# Patient Record
Sex: FEMALE | Race: WHITE | NOT HISPANIC OR LATINO | Employment: FULL TIME | URBAN - METROPOLITAN AREA
[De-identification: names, ages, dates, MRNs, and addresses within clinical notes are randomized per-mention and may not be internally consistent; named-entity substitution may affect disease eponyms.]

---

## 2017-01-19 ENCOUNTER — OFFICE VISIT (OUTPATIENT)
Dept: MEDICAL GROUP | Age: 32
End: 2017-01-19
Payer: COMMERCIAL

## 2017-01-19 VITALS
WEIGHT: 164 LBS | HEART RATE: 74 BPM | OXYGEN SATURATION: 98 % | RESPIRATION RATE: 14 BRPM | DIASTOLIC BLOOD PRESSURE: 80 MMHG | BODY MASS INDEX: 28 KG/M2 | HEIGHT: 64 IN | TEMPERATURE: 98.4 F | SYSTOLIC BLOOD PRESSURE: 110 MMHG

## 2017-01-19 DIAGNOSIS — I34.1 MVP (MITRAL VALVE PROLAPSE): ICD-10-CM

## 2017-01-19 DIAGNOSIS — F17.200 TOBACCO DEPENDENCE: ICD-10-CM

## 2017-01-19 DIAGNOSIS — N94.3 PMS (PREMENSTRUAL SYNDROME): ICD-10-CM

## 2017-01-19 DIAGNOSIS — Z76.89 ENCOUNTER TO ESTABLISH CARE: ICD-10-CM

## 2017-01-19 DIAGNOSIS — Z30.011 ORAL CONTRACEPTION INITIATION: ICD-10-CM

## 2017-01-19 PROCEDURE — 99204 OFFICE O/P NEW MOD 45 MIN: CPT | Performed by: PHYSICIAN ASSISTANT

## 2017-01-19 RX ORDER — DROSPIRENONE AND ETHINYL ESTRADIOL 0.02-3(28)
1 KIT ORAL DAILY
COMMUNITY
End: 2017-01-19

## 2017-01-19 RX ORDER — NORETHINDRONE ACETATE AND ETHINYL ESTRADIOL 1.5-30(21)
1 KIT ORAL DAILY
Qty: 28 TAB | Refills: 5 | Status: SHIPPED | OUTPATIENT
Start: 2017-01-19 | End: 2019-03-13

## 2017-01-19 ASSESSMENT — PATIENT HEALTH QUESTIONNAIRE - PHQ9: CLINICAL INTERPRETATION OF PHQ2 SCORE: 0

## 2017-01-19 NOTE — PROGRESS NOTES
"Subjective:   Gabi Ruth is a 31 y.o. female here today to establish care and to change her OCP.    PMS (premenstrual syndrome)  This is a 31-year-old female who has been taking Pascual for 10 years. She states that over the past year or so she has severe PMS. She has significant emotional swings. Also she has irregular bleeding where her bleeding may last for 9 days. She is requesting a change in her OCP.    Oral contraception initiation  Visit is more to change her current Pascual prescription to another medication may control her PMS better. It's always been conscious about the possibility of having a stroke or blood clot while on birth control. She is currently in a  monogamous relationship. She has no children yet.    Tobacco dependence  History of smoking. Used to smoke one pack a day. She did quit for 2 years. Since returning to [place] 2 years ago she smokes about one cigarette a week.    MVP (mitral valve prolapse)  States that she was diagnosed with mitral valve prolapse with regurgitation as an infant. Was on medications. Did follow with a cardiologist. Has no current symptoms. No shortness of breath or chest pain.       Current medicines (including changes today)  Current Outpatient Prescriptions   Medication Sig Dispense Refill   • drospirenone-ethinyl estradiol (PASCUAL) 3-0.02 MG per tablet Take 1 Tab by mouth every day.     • norethindrone-ethinyl estradiol-iron (MICROGESTIN FE1.5/30) 1.5-30 MG-MCG tablet Take 1 Tab by mouth every day. 28 Tab 5     No current facility-administered medications for this visit.     She  has no past medical history on file.    ROS   No chest pain, no shortness of breath, no abdominal pain and all other systems were reviewed and are negative.       Objective:     Blood pressure 110/80, pulse 74, temperature 36.9 °C (98.4 °F), resp. rate 14, height 1.626 m (5' 4.02\"), weight 74.39 kg (164 lb), last menstrual period 01/03/2017, SpO2 98 %, not currently breastfeeding. Body mass " index is 28.14 kg/(m^2).   Physical Exam:  Constitutional: Alert, no distress.  Skin: Warm, dry, good turgor, no rashes in visible areas.  Eye: Equal, round and reactive, conjunctiva clear, lids normal.  ENMT: Lips without lesions, good dentition, oropharynx clear.  Neck: Trachea midline, no masses.   Lymph: No cervical or supraclavicular lymphadenopathy  Respiratory: Unlabored respiratory effort, lungs clear to auscultation, no wheezes, no ronchi.  Cardiovascular: Normal S1, S2, no murmur, no edema.  Abdomen: Soft, non-tender, no masses.  Psych: Alert and oriented x3, normal affect and mood.        Assessment and Plan:   The following treatment plan was discussed    1. PMS (premenstrual syndrome)  Chronic condition. Changed OCP from Umm to Microgestin. Advised to contact me if symptoms not improving. Advised to make an appointment with gynecology.  - norethindrone-ethinyl estradiol-iron (MICROGESTIN FE1.5/30) 1.5-30 MG-MCG tablet; Take 1 Tab by mouth every day.  Dispense: 28 Tab; Refill: 5    2. Oral contraception initiation  Prescribed Microgestin as directed.  - norethindrone-ethinyl estradiol-iron (MICROGESTIN FE1.5/30) 1.5-30 MG-MCG tablet; Take 1 Tab by mouth every day.  Dispense: 28 Tab; Refill: 5    3. Tobacco dependence  Currently one cigarette per week. Did offer referral to the tobacco cessation program but patient declined.     4. MVP (mitral valve prolapse)  Asymptomatic. No murmur noted. Will monitor. Advised to contact me if she becomes symptomatic.    5. Encounter to establish care      Followup: Return if symptoms worsen or fail to improve.    Please note that this dictation was created using voice recognition software. I have made every reasonable attempt to correct obvious errors, but I expect that there are errors of grammar and possibly content that I did not discover before finalizing the note.

## 2017-01-19 NOTE — MR AVS SNAPSHOT
"        Gabi Ruth   2017 2:20 PM   Office Visit   MRN: [id number]    Department:  [facility]   Dept Phone:  [phone]    Description:  Female : 1985   Provider:  Erick Marie PA-C           Reason for Visit     Establish Care Change birth control pill      Allergies as of 2017     Allergen Noted Reactions    Amoxicillin 2016   Hives      You were diagnosed with     PMS (premenstrual syndrome)   [036496]       Oral contraception initiation   [6284987]       Tobacco dependence   [343460]       MVP (mitral valve prolapse)   [636511]         Vital Signs     Blood Pressure Pulse Temperature Respirations Height Weight    110/80 mmHg 74 36.9 °C (98.4 °F) 14 1.626 m (5' 4.02\") 74.39 kg (164 lb)    Body Mass Index Oxygen Saturation Last Menstrual Period Breastfeeding? Smoking Status       28.14 kg/m2 98% 2017 No Light Tobacco Smoker       Basic Information     Date Of Birth Sex Race Ethnicity Preferred Language    1985 Female White Non- English      Problem List              ICD-10-CM Priority Class Noted - Resolved    Oral contraception initiation Z30.011   2017 - Present    PMS (premenstrual syndrome) N94.3   2017 - Present    Tobacco dependence F17.200   2017 - Present    MVP (mitral valve prolapse) I34.1   2017 - Present      Health Maintenance        Date Due Completion Dates    IMM DTaP/Tdap/Td Vaccine (1 - Tdap) 10/2/2004 ---    PAP SMEAR 10/2/2006 ---    IMM INFLUENZA (1) 2018 (Originally 2016) ---            Current Immunizations     No immunizations on file.      Below and/or attached are the medications your provider expects you to take. Review all of your home medications and newly ordered medications with your provider and/or pharmacist. Follow medication instructions as directed by your provider and/or pharmacist. Please keep your medication list with you and share with your provider. Update the information when " medications are discontinued, doses are changed, or new medications (including over-the-counter products) are added; and carry medication information at all times in the event of emergency situations     Allergies:  AMOXICILLIN - Hives               Medications  Valid as of: January 19, 2017 -  2:52 PM    Generic Name Brand Name Tablet Size Instructions for use    Drospirenone-Ethinyl Estradiol (Tab) PASCUAL 3-0.02 MG Take 1 Tab by mouth every day.        Norethin Ace-Eth Estrad-FE (Tab) MICROGESTIN FE1.5/30 1.5-30 MG-MCG Take 1 Tab by mouth every day.        .                 Medicines prescribed today were sent to:     [id number] - [place], [place] - [address]    [address] [place] [address]    Phone: [phone] Fax: [phone]    Open 24 Hours?: No      Medication refill instructions:       If your prescription bottle indicates you have medication refills left, it is not necessary to call your provider’s office. Please contact your pharmacy and they will refill your medication.    If your prescription bottle indicates you do not have any refills left, you may request refills at any time through one of the following ways: The online [facility] system (except Urgent Care), by calling your provider’s office, or by asking your pharmacy to contact your provider’s office with a refill request. Medication refills are processed only during regular business hours and may not be available until the next business day. Your provider may request additional information or to have a follow-up visit with you prior to refilling your medication.   *Please Note: Medication refills are assigned a new Rx number when refilled electronically. Your pharmacy may indicate that no refills were authorized even though a new prescription for the same medication is available at the pharmacy. Please request the medicine by name with the pharmacy before contacting your provider for a refill.           [employer] Access Code: Activation code  not generated  Current MyChart Status: Active

## 2017-01-19 NOTE — ASSESSMENT & PLAN NOTE
Visit is more to change her current Umm prescription to another medication may control her PMS better. It's always been conscious about the possibility of having a stroke or blood clot while on birth control. She is currently in a  monogamous relationship. She has no children yet.

## 2017-01-19 NOTE — ASSESSMENT & PLAN NOTE
This is a 31-year-old female who has been taking Umm for 10 years. She states that over the past year or so she has severe PMS. She has significant emotional swings. Also she has irregular bleeding where her bleeding may last for 9 days. She is requesting a change in her OCP.

## 2017-01-19 NOTE — Clinical Note
[facility]  Erick Marie PA-C  [address]  [place] [address]  Fax: [phone] Authorization for Release/Disclosure of Protected Health Information   Name: GABI ROGERS : 1985 SSN: [id number]   Address: [address] [place] [address]  [place] [address] Phone:    [phone] (home)    I authorize the entity listed below to release/disclose the PHI below to [facility]/Erick Marie PA-C   Provider or Entity Name:    Dr Erick [facility]   Address   City, [facility], [facility], [place]   Phone:      Fax:     Reason for request: continuity of care   Information to be released:    [  ] LAST COLONOSCOPY, including any PATH REPORT [  ] LAST DEXA  [  ] LAST MAMMOGRAM  [xxx] LAST PAP [  ] RETINA EXAM REPORT  [  ] IMMUNIZATION RECORDS  [xxxx ] Release all info      [  ] Check here and initial the line next to each item to release ALL health information INCLUDING  _____ Care and treatment for drug and / or alcohol abuse  _____ HIV testing, infection status, or AIDS  _____ Genetic Testing    DATES OF SERVICE OR TIME PERIOD TO BE DISCLOSED: _____________  I understand and acknowledge that:  * This Authorization may be revoked at any time by you in writing, except if your health information has already been used or disclosed.  * Your health information that will be used or disclosed as a result of you signing this authorization could be re-disclosed by the recipient. If this occurs, your re-disclosed health information may no longer be protected by State or Federal laws.  * You may refuse to sign this Authorization. Your refusal will not affect your ability to obtain treatment.  * This Authorization becomes effective upon signing and will  on (date) __________. If no date is indicated, this Authorization will  one (1) year from the signature date.    Name: Gabi Rogers    Signature:     Date: 2017

## 2017-01-19 NOTE — ASSESSMENT & PLAN NOTE
States that she was diagnosed with mitral valve prolapse with regurgitation as an infant. Was on medications. Did follow with a cardiologist. Has no current symptoms. No shortness of breath or chest pain.

## 2017-01-19 NOTE — ASSESSMENT & PLAN NOTE
History of smoking. Used to smoke one pack a day. She did quit for 2 years. Since returning to [place] 2 years ago she smokes about one cigarette a week.

## 2019-03-13 ENCOUNTER — OFFICE VISIT (OUTPATIENT)
Dept: URGENT CARE | Facility: CLINIC | Age: 34
End: 2019-03-13
Payer: COMMERCIAL

## 2019-03-13 VITALS
DIASTOLIC BLOOD PRESSURE: 80 MMHG | OXYGEN SATURATION: 95 % | TEMPERATURE: 98.1 F | RESPIRATION RATE: 18 BRPM | HEART RATE: 116 BPM | WEIGHT: 195 LBS | HEIGHT: 64 IN | BODY MASS INDEX: 33.29 KG/M2 | SYSTOLIC BLOOD PRESSURE: 130 MMHG

## 2019-03-13 DIAGNOSIS — I34.1 MVP (MITRAL VALVE PROLAPSE): ICD-10-CM

## 2019-03-13 DIAGNOSIS — J02.9 ACUTE PHARYNGITIS, UNSPECIFIED ETIOLOGY: ICD-10-CM

## 2019-03-13 LAB
INT CON NEG: NORMAL
INT CON POS: NORMAL
S PYO AG THROAT QL: NEGATIVE

## 2019-03-13 PROCEDURE — 99214 OFFICE O/P EST MOD 30 MIN: CPT | Performed by: NURSE PRACTITIONER

## 2019-03-13 PROCEDURE — 87880 STREP A ASSAY W/OPTIC: CPT | Performed by: NURSE PRACTITIONER

## 2019-03-13 RX ORDER — ETHINYL ESTRADIOL AND LEVONORGESTREL 150-30(84)
KIT ORAL
COMMUNITY
Start: 2019-02-19 | End: 2021-06-09

## 2019-03-13 RX ORDER — AZITHROMYCIN 250 MG/1
TABLET, FILM COATED ORAL
Qty: 6 TAB | Refills: 0 | Status: SHIPPED | OUTPATIENT
Start: 2019-03-13 | End: 2021-06-09

## 2019-03-13 NOTE — PROGRESS NOTES
Chief Complaint   Patient presents with   • Pharyngitis     x2days, sore throat, dry cough        HISTORY OF PRESENT ILLNESS: Patient is a 33 y.o. female who presents today due to complaints of a sore throat for the past two days. Reports associated fever, chills, pain with swallowing. Also notes mild dry cough and rhinitis, she is mostly concerned about her throat.  She has tried OTC medications at home without much improvement. Denies known ill contacts. Notes history of MVP.     Patient Active Problem List    Diagnosis Date Noted   • Oral contraception initiation 01/19/2017   • PMS (premenstrual syndrome) 01/19/2017   • Tobacco dependence 01/19/2017   • MVP (mitral valve prolapse) 01/19/2017       Allergies:Amoxicillin    Current Outpatient Prescriptions Ordered in [facility]   Medication Sig Dispense Refill   • Levonorgest-Eth Estrad 91-Day (CAMRESE PO) Take  by mouth.     • azithromycin (ZITHROMAX) 250 MG Tab Take two tabs on day one followed by one tab on days 2-5. 6 Tab 0   • AMETHIA 0.15-0.03 &0.01 MG Tab        No current Epic-ordered facility-administered medications on file.        History reviewed. No pertinent past medical history.    Social History   Substance Use Topics   • Smoking status: Former Smoker     Packs/day: 0.01     Types: Cigarettes   • Smokeless tobacco: Never Used   • Alcohol use 4.2 oz/week     7 Glasses of wine per week      Comment: social smoker       Family Status   Relation Status   • Mo Alive   • Fa Alive   • PGFa (Not Specified)     Family History   Problem Relation Age of Onset   • Stroke Paternal Grandfather        ROS:  Review of Systems   Constitutional: Positive for fever, chills. Negative for weight loss and malaise/fatigue.   HENT: Positive for sore throat. Negative for ear pain, nosebleeds, congestion.   Eyes: Negative for vision changes.   Cardiovascular: Negative for chest pain, palpitations, orthopnea and leg swelling.   Respiratory: Negative for cough, sputum production,  "shortness of breath and wheezing.   Gastrointestinal: Negative for abdominal pain, nausea, vomiting or diarrhea.   Skin: Negative for rash, diaphoresis.     Exam:  Blood pressure 130/80, pulse (!) 116, temperature 36.7 °C (98.1 °F), temperature source Temporal, resp. rate 18, height 1.626 m (5' 4\"), weight 88.5 kg (195 lb), SpO2 95 %, not currently breastfeeding.  General: well-nourished, well-developed female in NAD  Head: normocephalic, atraumatic  Eyes: PERRLA, no conjunctival injection, acuity grossly intact, lids normal.  Ears: normal shape and symmetry, no tenderness, no discharge. External canals are without any significant edema or erythema. Tympanic membranes are without any inflammation, no effusion. Gross auditory acuity is intact.  Nose: symmetrical without tenderness, no discharge.  Mouth/Throat: reasonable hygiene. There is minimal erythema, without exudates and tonsillar enlargement present.  Neck: no masses, range of motion within normal limits, no tracheal deviation. No obvious thyroid enlargement.   Lymph: bilateral anterior cervical adenopathy. No supraclavicular adenopathy.   Neuro: alert and oriented. Cranial nerves 1-12 grossly intact. No sensory deficit.   Cardiovascular: regular rate (ausculated at 98), regular rhythm. No edema.  Pulmonary: no distress. Chest is symmetrical with respiration, no wheezes, crackles, or rhonchi.   Musculoskeletal: no clubbing, appropriate muscle tone, gait is stable.  Skin: warm, dry, intact, no clubbing, no cyanosis, no rashes.   Psych: appropriate mood, affect, judgement.         Assessment/Plan:  1. Acute pharyngitis, unspecified etiology  POCT Rapid Strep A    azithromycin (ZITHROMAX) 250 MG Tab   2. MVP (mitral valve prolapse)  POCT Rapid Strep A    azithromycin (ZITHROMAX) 250 MG Tab         POC strep negative      Discussed symptoms most likely viral, self limiting illness. Did not see any evidence of a bacterial process. Discussed natural progression and " sx care. Contingent antibiotic prescription given to patient to fill upon meeting criteria of guidelines discussed. OTC motrin or tylenol for pain/fever control. Hand hygiene. Increase fluid intake, rest. Warm salt water gargles.   Supportive care, differential diagnoses, and indications for immediate follow-up discussed with patient.   Pathogenesis of diagnosis discussed including typical length and natural progression.   Instructed to return to clinic or nearest emergency department for any change in condition, further concerns, or worsening of symptoms.  Patient states understanding of the plan of care and discharge instructions.  Instructed to make an appointment, for follow up, with her primary care provider.        Please note that this dictation was created using voice recognition software. I have made every reasonable attempt to correct obvious errors, but I expect that there are errors of grammar and possibly content that I did not discover before finalizing the note.      STEPHANI Bain.

## 2021-06-09 ENCOUNTER — HOSPITAL ENCOUNTER (OUTPATIENT)
Facility: MEDICAL CENTER | Age: 36
End: 2021-06-09
Attending: SPECIALIST
Payer: COMMERCIAL

## 2021-06-09 ENCOUNTER — OFFICE VISIT (OUTPATIENT)
Dept: URGENT CARE | Facility: CLINIC | Age: 36
End: 2021-06-09
Payer: COMMERCIAL

## 2021-06-09 ENCOUNTER — HOSPITAL ENCOUNTER (OUTPATIENT)
Facility: MEDICAL CENTER | Age: 36
End: 2021-06-09
Attending: PHYSICIAN ASSISTANT
Payer: COMMERCIAL

## 2021-06-09 ENCOUNTER — HOSPITAL ENCOUNTER (OUTPATIENT)
Dept: RADIOLOGY | Facility: MEDICAL CENTER | Age: 36
End: 2021-06-09
Attending: SPECIALIST
Payer: COMMERCIAL

## 2021-06-09 VITALS
WEIGHT: 229 LBS | SYSTOLIC BLOOD PRESSURE: 106 MMHG | TEMPERATURE: 97.2 F | HEART RATE: 100 BPM | OXYGEN SATURATION: 97 % | DIASTOLIC BLOOD PRESSURE: 60 MMHG | RESPIRATION RATE: 16 BRPM | BODY MASS INDEX: 39.09 KG/M2 | HEIGHT: 64 IN

## 2021-06-09 DIAGNOSIS — R10.2 PELVIC PAIN IN FEMALE: ICD-10-CM

## 2021-06-09 DIAGNOSIS — B96.89 BV (BACTERIAL VAGINOSIS): ICD-10-CM

## 2021-06-09 DIAGNOSIS — R10.32 LEFT LOWER QUADRANT ABDOMINAL PAIN: ICD-10-CM

## 2021-06-09 DIAGNOSIS — N76.0 BV (BACTERIAL VAGINOSIS): ICD-10-CM

## 2021-06-09 DIAGNOSIS — N76.0 ACUTE VAGINITIS: ICD-10-CM

## 2021-06-09 LAB
APPEARANCE UR: CLEAR
BILIRUB UR STRIP-MCNC: NEGATIVE MG/DL
COLOR UR AUTO: YELLOW
GLUCOSE UR STRIP.AUTO-MCNC: NEGATIVE MG/DL
INT CON NEG: NORMAL
INT CON POS: NORMAL
KETONES UR STRIP.AUTO-MCNC: NEGATIVE MG/DL
LEUKOCYTE ESTERASE UR QL STRIP.AUTO: NORMAL
NITRITE UR QL STRIP.AUTO: 0.2
PH UR STRIP.AUTO: NEGATIVE [PH] (ref 5–8)
POC URINE PREGNANCY TEST: NEGATIVE
PROT UR QL STRIP: 6 MG/DL
RBC UR QL AUTO: 1.02
SP GR UR STRIP.AUTO: NEGATIVE
UROBILINOGEN UR STRIP-MCNC: NEGATIVE MG/DL

## 2021-06-09 PROCEDURE — 76830 TRANSVAGINAL US NON-OB: CPT

## 2021-06-09 PROCEDURE — 81025 URINE PREGNANCY TEST: CPT | Performed by: PHYSICIAN ASSISTANT

## 2021-06-09 PROCEDURE — 81002 URINALYSIS NONAUTO W/O SCOPE: CPT | Performed by: PHYSICIAN ASSISTANT

## 2021-06-09 PROCEDURE — 87491 CHLMYD TRACH DNA AMP PROBE: CPT

## 2021-06-09 PROCEDURE — 99214 OFFICE O/P EST MOD 30 MIN: CPT | Performed by: PHYSICIAN ASSISTANT

## 2021-06-09 PROCEDURE — 87660 TRICHOMONAS VAGIN DIR PROBE: CPT

## 2021-06-09 PROCEDURE — 87086 URINE CULTURE/COLONY COUNT: CPT | Mod: 91

## 2021-06-09 PROCEDURE — 87591 N.GONORRHOEAE DNA AMP PROB: CPT

## 2021-06-09 PROCEDURE — 87480 CANDIDA DNA DIR PROBE: CPT

## 2021-06-09 PROCEDURE — 87510 GARDNER VAG DNA DIR PROBE: CPT

## 2021-06-09 PROCEDURE — 87086 URINE CULTURE/COLONY COUNT: CPT

## 2021-06-09 ASSESSMENT — ENCOUNTER SYMPTOMS
DIARRHEA: 0
CHILLS: 0
FLANK PAIN: 0
VOMITING: 0
FEVER: 0
ABDOMINAL PAIN: 1
NAUSEA: 0

## 2021-06-09 NOTE — PROGRESS NOTES
"Subjective:   Gabi Ruth  is a 35 y.o. female who presents for Abdominal Pain (x1wk, lower left abdominal pain during menstural cycle, deep pain when urinating, odor, painful to walk in vaginal area)      Abdominal Pain  This is a new problem. The current episode started in the past 7 days. Pertinent negatives include no diarrhea, dysuria, fever, frequency, hematuria, nausea or vomiting.   Pt notes last 5wks of menstrual cramping w/ worsening over last one week. Denies fever/chills. Denies dysuria/freq/urg/hematuria. Denies nausea/vomiting. Denies PMH of abd surgery. Tried OTC ibu. One week s/p LMP. Has been off of birth control over same time frame. Using tampons for first time. Denies possibility of retained FOB. Denies concern for STI. Denies genital rash or lesions. Notes PMH of HSV. Denies chance of preg. C/o lower left abd achy discomfort worse over last one week. She notes PMH of pyelo but denies UTI s/sx or flank pain now. Denies PMH of kidney stone.     Review of Systems   Constitutional: Negative for chills and fever.   Gastrointestinal: Positive for abdominal pain. Negative for diarrhea, nausea and vomiting.   Genitourinary: Negative for dysuria, flank pain, frequency, hematuria and urgency.   Skin: Negative for rash.       Allergies   Allergen Reactions   • Amoxicillin Hives        Objective:   /60 (BP Location: Left arm, Patient Position: Sitting, BP Cuff Size: Large adult)   Pulse 100   Temp 36.2 °C (97.2 °F) (Temporal)   Resp 16   Ht 1.626 m (5' 4\")   Wt 104 kg (229 lb)   SpO2 97%   BMI 39.31 kg/m²     Physical Exam  Vitals and nursing note reviewed.   Constitutional:       General: She is not in acute distress.     Appearance: She is well-developed. She is obese. She is not toxic-appearing or diaphoretic.   HENT:      Head: Normocephalic and atraumatic.      Right Ear: External ear normal.      Left Ear: External ear normal.      Nose: Nose normal.   Eyes:      General: Lids " "are normal. No scleral icterus.        Right eye: No discharge.         Left eye: No discharge.      Conjunctiva/sclera: Conjunctivae normal.   Pulmonary:      Effort: Pulmonary effort is normal. No respiratory distress.   Abdominal:      General: Abdomen is protuberant. Bowel sounds are normal.      Palpations: Abdomen is soft.      Tenderness: There is abdominal tenderness in the right lower quadrant, suprapubic area and left lower quadrant. There is guarding. There is no right CVA tenderness, left CVA tenderness or rebound. Positive signs include McBurney's sign. Negative signs include Haynes's sign.   Musculoskeletal:         General: Normal range of motion.      Cervical back: Neck supple.   Skin:     General: Skin is warm and dry.      Coloration: Skin is not pale.      Findings: No erythema.   Neurological:      Mental Status: She is alert and oriented to person, place, and time. She is not disoriented.   Psychiatric:         Speech: Speech normal.         Behavior: Behavior normal.       [facility] Scheduling is \"DOWN\" and not answering, patient with lower abdominal pain and guarding is therefore directed to ER for further care and work up after waiting in clinic to no avail.    Results for orders placed or performed in visit on 06/09/21   POCT Urinalysis   Result Value Ref Range    POC Color yellow Negative    POC Appearance clear Negative    POC Leukocyte Esterase trace Negative    POC Nitrites 0.2 Negative    POC Urobiligen negative Negative (0.2) mg/dL    POC Protein 6.0 Negative mg/dL    POC Urine PH negative 5.0 - 8.0    POC Blood 1.020 Negative    POC Specific Gravity negative <1.005 - >1.030    POC Ketones negative Negative mg/dL    POC Bilirubin negative Negative mg/dL    POC Glucose negative Negative mg/dL   POCT Pregnancy   Result Value Ref Range    POC Urine Pregnancy Test Negative Negative    Internal Control Positive Valid     Internal Control Negative Valid      Vaginal pathogens has resulted " positive for BV-this is reviewed with patient by phone June 13, 2021.  Treatment medication sent to pharmacy.    Assessment/Plan:   1. Left lower quadrant abdominal pain  - POCT Urinalysis  - POCT Pregnancy  - VAGINAL PATHOGENS DNA PANEL; Future  - URINE CULTURE(NEW); Future    2. Acute vaginitis  - POCT Urinalysis  - POCT Pregnancy  - VAGINAL PATHOGENS DNA PANEL; Future  - URINE CULTURE(NEW); Future    3. BV (bacterial vaginosis)    -At time of care patient is directed to ER due to inability to schedule advanced imaging for lower abdominal pain.  -She is able to make appointment with gynecology is seen on date of service.  -BV is found on vaginal pathogens testing and Flagyl sent to pharmacy  -Results reviewed with patient by phone.  -Return to clinic with lack of resolution or progression of symptoms.  ER precautions with any worsening symptoms are reviewed with patient/caregiver and they do express understanding      I have worn an N95 mask, gloves and eye protection for the entire encounter with this patient.     Differential diagnosis, natural history, supportive care, and indications for immediate follow-up discussed.

## 2021-06-10 LAB
C TRACH DNA SPEC QL NAA+PROBE: NEGATIVE
CANDIDA DNA VAG QL PROBE+SIG AMP: NEGATIVE
G VAGINALIS DNA VAG QL PROBE+SIG AMP: POSITIVE
N GONORRHOEA DNA SPEC QL NAA+PROBE: NEGATIVE
SPECIMEN SOURCE: NORMAL
T VAGINALIS DNA VAG QL PROBE+SIG AMP: NEGATIVE

## 2021-06-11 ENCOUNTER — HOSPITAL ENCOUNTER (OUTPATIENT)
Dept: LAB | Facility: MEDICAL CENTER | Age: 36
End: 2021-06-11
Attending: SPECIALIST
Payer: COMMERCIAL

## 2021-06-11 LAB
ALBUMIN SERPL BCP-MCNC: 4.2 G/DL (ref 3.2–4.9)
ALBUMIN/GLOB SERPL: 1.3 G/DL
ALP SERPL-CCNC: 111 U/L (ref 30–99)
ALT SERPL-CCNC: 18 U/L (ref 2–50)
ANION GAP SERPL CALC-SCNC: 9 MMOL/L (ref 7–16)
AST SERPL-CCNC: 18 U/L (ref 12–45)
BASOPHILS # BLD AUTO: 0.7 % (ref 0–1.8)
BASOPHILS # BLD: 0.05 K/UL (ref 0–0.12)
BILIRUB SERPL-MCNC: 0.2 MG/DL (ref 0.1–1.5)
BUN SERPL-MCNC: 10 MG/DL (ref 8–22)
CALCIUM SERPL-MCNC: 9.3 MG/DL (ref 8.4–10.2)
CHLORIDE SERPL-SCNC: 106 MMOL/L (ref 96–112)
CHOLEST SERPL-MCNC: 205 MG/DL (ref 100–199)
CO2 SERPL-SCNC: 24 MMOL/L (ref 20–33)
CREAT SERPL-MCNC: 0.75 MG/DL (ref 0.5–1.4)
EOSINOPHIL # BLD AUTO: 0.12 K/UL (ref 0–0.51)
EOSINOPHIL NFR BLD: 1.6 % (ref 0–6.9)
ERYTHROCYTE [DISTWIDTH] IN BLOOD BY AUTOMATED COUNT: 43.3 FL (ref 35.9–50)
FASTING STATUS PATIENT QL REPORTED: NORMAL
GLOBULIN SER CALC-MCNC: 3.2 G/DL (ref 1.9–3.5)
GLUCOSE SERPL-MCNC: 107 MG/DL (ref 65–99)
HCT VFR BLD AUTO: 44.1 % (ref 37–47)
HDLC SERPL-MCNC: 72 MG/DL
HGB BLD-MCNC: 13.8 G/DL (ref 12–16)
IMM GRANULOCYTES # BLD AUTO: 0.02 K/UL (ref 0–0.11)
IMM GRANULOCYTES NFR BLD AUTO: 0.3 % (ref 0–0.9)
LDLC SERPL CALC-MCNC: 116 MG/DL
LYMPHOCYTES # BLD AUTO: 2.21 K/UL (ref 1–4.8)
LYMPHOCYTES NFR BLD: 29.3 % (ref 22–41)
MCH RBC QN AUTO: 27.5 PG (ref 27–33)
MCHC RBC AUTO-ENTMCNC: 31.3 G/DL (ref 33.6–35)
MCV RBC AUTO: 88 FL (ref 81.4–97.8)
MONOCYTES # BLD AUTO: 0.42 K/UL (ref 0–0.85)
MONOCYTES NFR BLD AUTO: 5.6 % (ref 0–13.4)
NEUTROPHILS # BLD AUTO: 4.72 K/UL (ref 2–7.15)
NEUTROPHILS NFR BLD: 62.5 % (ref 44–72)
NRBC # BLD AUTO: 0 K/UL
NRBC BLD-RTO: 0 /100 WBC
PLATELET # BLD AUTO: 362 K/UL (ref 164–446)
PMV BLD AUTO: 9.7 FL (ref 9–12.9)
POTASSIUM SERPL-SCNC: 4.7 MMOL/L (ref 3.6–5.5)
PROT SERPL-MCNC: 7.4 G/DL (ref 6–8.2)
RBC # BLD AUTO: 5.01 M/UL (ref 4.2–5.4)
SODIUM SERPL-SCNC: 139 MMOL/L (ref 135–145)
T4 FREE SERPL-MCNC: 1.07 NG/DL (ref 0.93–1.7)
TRIGL SERPL-MCNC: 83 MG/DL (ref 0–149)
TSH SERPL DL<=0.005 MIU/L-ACNC: 0.87 UIU/ML (ref 0.38–5.33)
WBC # BLD AUTO: 7.5 K/UL (ref 4.8–10.8)

## 2021-06-11 PROCEDURE — 80061 LIPID PANEL: CPT

## 2021-06-11 PROCEDURE — 85025 COMPLETE CBC W/AUTO DIFF WBC: CPT

## 2021-06-11 PROCEDURE — 36415 COLL VENOUS BLD VENIPUNCTURE: CPT

## 2021-06-11 PROCEDURE — 80053 COMPREHEN METABOLIC PANEL: CPT

## 2021-06-11 PROCEDURE — 83036 HEMOGLOBIN GLYCOSYLATED A1C: CPT

## 2021-06-11 PROCEDURE — 84443 ASSAY THYROID STIM HORMONE: CPT

## 2021-06-11 PROCEDURE — 84439 ASSAY OF FREE THYROXINE: CPT

## 2021-06-12 LAB
BACTERIA UR CULT: NORMAL
BACTERIA UR CULT: NORMAL
SIGNIFICANT IND 70042: NORMAL
SIGNIFICANT IND 70042: NORMAL
SITE SITE: NORMAL
SITE SITE: NORMAL
SOURCE SOURCE: NORMAL
SOURCE SOURCE: NORMAL

## 2021-06-13 ENCOUNTER — TELEPHONE (OUTPATIENT)
Dept: URGENT CARE | Facility: CLINIC | Age: 36
End: 2021-06-13

## 2021-06-13 DIAGNOSIS — N76.0 BV (BACTERIAL VAGINOSIS): ICD-10-CM

## 2021-06-13 DIAGNOSIS — B96.89 BV (BACTERIAL VAGINOSIS): ICD-10-CM

## 2021-06-13 RX ORDER — METRONIDAZOLE 500 MG/1
500 TABLET ORAL 2 TIMES DAILY
Qty: 14 TABLET | Refills: 0 | Status: SHIPPED | OUTPATIENT
Start: 2021-06-13 | End: 2021-06-20

## 2021-06-13 NOTE — PROGRESS NOTES
Sent to different pharmacy per patient request      1. BV (bacterial vaginosis)    - metroNIDAZOLE (FLAGYL) 500 MG Tab; Take 1 tablet by mouth 2 times a day for 7 days.  Dispense: 14 tablet; Refill: 0

## 2021-06-13 NOTE — TELEPHONE ENCOUNTER
Gabi Guillen called today and has questions about her lab results. She also states that she feels worse and that she now has diarrhea. She believes it is related to her vaginal pathogens.

## 2021-06-14 LAB
EST. AVERAGE GLUCOSE BLD GHB EST-MCNC: 120 MG/DL
HBA1C MFR BLD: 5.8 % (ref 4–5.6)

## 2021-10-26 ENCOUNTER — GYNECOLOGY VISIT (OUTPATIENT)
Dept: OBGYN | Facility: CLINIC | Age: 36
End: 2021-10-26
Payer: COMMERCIAL

## 2021-10-26 VITALS — BODY MASS INDEX: 38.11 KG/M2 | SYSTOLIC BLOOD PRESSURE: 151 MMHG | WEIGHT: 222 LBS | DIASTOLIC BLOOD PRESSURE: 82 MMHG

## 2021-10-26 DIAGNOSIS — N93.9 ABNORMAL UTERINE BLEEDING (AUB): ICD-10-CM

## 2021-10-26 PROBLEM — F17.200 TOBACCO DEPENDENCE: Status: RESOLVED | Noted: 2017-01-19 | Resolved: 2021-10-26

## 2021-10-26 PROCEDURE — 99203 OFFICE O/P NEW LOW 30 MIN: CPT | Performed by: OBSTETRICS & GYNECOLOGY

## 2021-10-26 RX ORDER — LEVONORGESTREL AND ETHINYL ESTRADIOL 0.1-0.02MG
1 KIT ORAL DAILY
COMMUNITY
End: 2022-11-18

## 2021-10-26 RX ORDER — LEVONORGESTREL AND ETHINYL ESTRADIOL 0.15-0.03
1 KIT ORAL DAILY
Qty: 84 TABLET | Refills: 4 | Status: SHIPPED | OUTPATIENT
Start: 2021-10-26 | End: 2023-03-15

## 2021-10-26 ASSESSMENT — FIBROSIS 4 INDEX: FIB4 SCORE: 0.42

## 2021-10-26 NOTE — PROGRESS NOTES
Chief Complaint   Patient presents with   • Gynecologic Exam       History of present illness: 36 y.o. presents to [facility] care. She was a previous patient of Dr. Escobar who has since retired.   Patient has a long history of irregular menses. States she was started on KYLE shortly after menarche due to long bleeding episodes and has been on some form of birth control since that time. During the pandemic she went off of KYLE in 2020 and was having regular but heavy menses. In  she got bacterial vaginosis which caused her a lot of pelvic pain. She was started on Lutera to prevent pelvic pain and had blood work with ultrasound to rule out PCOS. She brings in records today which show negative for cysts in the ovaries, normal thyroid, and an A1C of 5.8. Patient does have a history of one ocular migraine 3 years ago and no further headaches since then. She was switched to Norethindrone from KYLE which caused her periods to be very irregular.  Patient states she put herself back on Lutera and was ok during the first month but started having bleeding and spotting starting the second month. She desires to have a reliable form of contraception which does not cause breakthrough bleeding.     Review of systems:  Pertinent positives documented in HPI and all other systems reviewed & are negative    PGYNHx:   Menarche: 13; started KYLE at age 15.   LMP: 10/13  Cycles irregular, bleeds for weeks or sometimes months  Sexually active with male partner, Lifetime partners 35  Birth control history: Umm, Seasonale, Norethindrone, Lutera .  STD hx: HSV  Last pap smear: ASCUS neg HPV in 2021, denies history of cervical biopsies or excisional procedures.  Positive hx Gardasil vaccination.   R/O PCOS workup: negative cysts on ultrasound, normal TSH, slightly elevated cholesterol and triglycerides, A1C 5.8. Reportedly normal testosterone levels by another Gyn provider.     POBHx:   OB History    Para Term  AB  Living   0 0 0 0 0 0   SAB TAB Ectopic Molar Multiple Live Births   0 0 0 0 0 0       All PMH, PSH, allergies, social history and FH reviewed and updated today:  Past Medical History:   Diagnosis Date   • Heart murmur        History reviewed. No pertinent surgical history.    Allergies:   Allergies   Allergen Reactions   • Amoxicillin Hives       Social History     Socioeconomic History   • Marital status: Single     Spouse name: Not on file   • Number of children: Not on file   • Years of education: Not on file   • Highest education level: Not on file   Occupational History   • Not on file   Tobacco Use   • Smoking status: Former Smoker     Packs/day: 0.01     Types: Cigarettes   • Smokeless tobacco: Never Used   Vaping Use   • Vaping Use: Never used   Substance and Sexual Activity   • Alcohol use: Yes     Alcohol/week: 4.2 oz     Types: 7 Glasses of wine per week     Comment: social smoker   • Drug use: Yes     Types: Marijuana, Inhaled   • Sexual activity: Yes     Partners: Male     Birth control/protection: Pill   Other Topics Concern   • Not on file   Social History Narrative   • Not on file     Social Determinants of Health     Financial Resource Strain:    • Difficulty of Paying Living Expenses:    Food Insecurity:    • Worried About Running Out of Food in the Last Year:    • Ran Out of Food in the Last Year:    Transportation Needs:    • Lack of Transportation (Medical):    • Lack of Transportation (Non-Medical):    Physical Activity:    • Days of Exercise per Week:    • Minutes of Exercise per Session:    Stress:    • Feeling of Stress :    Social Connections:    • Frequency of Communication with Friends and Family:    • Frequency of Social Gatherings with Friends and Family:    • Attends Muslim Services:    • Active Member of Clubs or Organizations:    • Attends Club or Organization Meetings:    • Marital Status:    Intimate Partner Violence:    • Fear of Current or Ex-Partner:    • Emotionally Abused:     • Physically Abused:    • Sexually Abused:        Family History   Problem Relation Age of Onset   • Cancer Mother         brain tumor   • No Known Problems Father    • Stroke Paternal Grandfather        Physical exam:  /82 (BP Location: Right arm, Patient Position: Sitting)   Wt 101 kg (222 lb)     General:appears stated age, is in no apparent distress  Head: normocephalic, non-tender  Neck: neck is supple, no jugular venous distension  CV :  no peripheral edema  Lungs: Normal respiratory effort.   Abdomen: Bowel sounds positive, nondistended, soft, nontender x4, no rebound or guarding. No organomegaly. No masses.  Skin: No rashes, or ulcers or lesions seen  Psychiatric: Patient shows appropriate affect, is alert and oriented x3, intact judgment and insight.      Assessment  1. Abnormal uterine bleeding (AUB)         Plan  - 36 y.o.  here with AUB  - discussed that although the US and labs do not definitively show PCOS her bleeding pattern is still anovulatory in nature.   AUB will occur when the lining is very thin or very thickened. In her case, her US from  showed a stripe of 0.59 so I suspect the former.   She definitively does not want pregnancy.     We did discuss endometrial ablation with concurrent bilateral salpingectomy. Ablation can decrease the heaviness of menses but only 50% of patients experience complete amenorrhea. She will discuss with her partner about this possibility.     She declines an implant (IUD and Nexplanon) because the idea of implants scare her.     She does accept Seasonale. We discussed if she does have another migraine with aura then she needs to be switched to a progesterone only form. There is an increased risk of stroke in women who have KYLE with migraines +aura. Pt voices understanding.     - Follow up 1 year.     Patient was seen for 32 minutes of which > 50% of appointment time was spent on face-to-face counseling and coordination of care regarding the  above.

## 2021-10-26 NOTE — NON-PROVIDER
New pt here today for Gyn appt.  Phone # [phone] (home)   Pharmacy verified.  Wants to discuss BC options

## 2022-05-11 ENCOUNTER — OFFICE VISIT (OUTPATIENT)
Dept: URGENT CARE | Facility: PHYSICIAN GROUP | Age: 37
End: 2022-05-11
Payer: COMMERCIAL

## 2022-05-11 VITALS
TEMPERATURE: 98.1 F | HEIGHT: 64 IN | DIASTOLIC BLOOD PRESSURE: 82 MMHG | OXYGEN SATURATION: 97 % | WEIGHT: 210 LBS | HEART RATE: 106 BPM | SYSTOLIC BLOOD PRESSURE: 122 MMHG | BODY MASS INDEX: 35.85 KG/M2

## 2022-05-11 DIAGNOSIS — H69.91 DYSFUNCTION OF RIGHT EUSTACHIAN TUBE: ICD-10-CM

## 2022-05-11 DIAGNOSIS — H92.09 OTALGIA, UNSPECIFIED LATERALITY: ICD-10-CM

## 2022-05-11 PROCEDURE — 99213 OFFICE O/P EST LOW 20 MIN: CPT | Performed by: PHYSICIAN ASSISTANT

## 2022-05-11 RX ORDER — LEVONORGESTREL AND ETHINYL ESTRADIOL 0.1-0.02MG
KIT ORAL
COMMUNITY
Start: 2021-09-26 | End: 2022-05-11

## 2022-05-11 RX ORDER — METHYLPREDNISOLONE 4 MG/1
TABLET ORAL
COMMUNITY
Start: 2022-05-04 | End: 2022-05-11

## 2022-05-11 RX ORDER — CETIRIZINE HYDROCHLORIDE, PSEUDOEPHEDRINE HYDROCHLORIDE 5; 120 MG/1; MG/1
1 TABLET, FILM COATED, EXTENDED RELEASE ORAL 2 TIMES DAILY
Qty: 60 TABLET | Refills: 1 | Status: SHIPPED | OUTPATIENT
Start: 2022-05-11 | End: 2022-06-10

## 2022-05-11 ASSESSMENT — ENCOUNTER SYMPTOMS
NAUSEA: 0
SHORTNESS OF BREATH: 0
WHEEZING: 0
DIARRHEA: 0
VOMITING: 0
SPUTUM PRODUCTION: 0
FEVER: 0
CHILLS: 0
COUGH: 0
ABDOMINAL PAIN: 0
SORE THROAT: 0

## 2022-05-11 ASSESSMENT — FIBROSIS 4 INDEX: FIB4 SCORE: 0.42

## 2022-05-11 NOTE — PROGRESS NOTES
"Subjective:   Gabi Ruth  is a 36 y.o. female who presents for Earache (Right ear x3 days)      HPI    Patient presents urgent care complaining of right ear fullness and discomfort times last 2 to 3 days.  She notes last approximate 10 days of upper respiratory infection symptoms that have been progressively improving over the last 3 to 4 days.  She notes 10 days ago she did have cough congestion and acute viral illness.  She was seen via telemedicine and prescribed a Medrol Dosepak which mildly helped alleviate/resolved symptoms.  She notes improving cough and congestion daily.  Denies fevers chills.  Denies ear drainage.  Denies left ear symptoms.  Denies tinnitus or abnormal hearing.  Denies history of adulthood AOM.  Has tried no treatments besides the above.    Review of Systems   Constitutional: Negative for chills and fever.   HENT: Positive for congestion ( resolving) and ear pain ( right). Negative for ear discharge, hearing loss, sore throat and tinnitus.    Respiratory: Negative for cough, sputum production, shortness of breath and wheezing.    Gastrointestinal: Negative for abdominal pain, diarrhea, nausea and vomiting.   Skin: Negative for rash.       Allergies   Allergen Reactions   • Amoxicillin Hives        Objective:   /82 (BP Location: Left arm, Patient Position: Sitting, BP Cuff Size: Adult)   Pulse (!) 106   Temp 36.7 °C (98.1 °F) (Temporal)   Ht 1.626 m (5' 4\")   Wt 95.3 kg (210 lb)   SpO2 97%   BMI 36.05 kg/m²     Physical Exam  Vitals and nursing note reviewed.   Constitutional:       General: She is not in acute distress.     Appearance: She is well-developed. She is not diaphoretic.   HENT:      Head: Normocephalic and atraumatic.      Right Ear: Ear canal and external ear normal. Tympanic membrane is bulging. Tympanic membrane is not erythematous.      Left Ear: Ear canal and external ear normal. Tympanic membrane is bulging. Tympanic membrane is not erythematous. "      Nose: Nose normal.      Mouth/Throat:      Pharynx: Uvula midline. Posterior oropharyngeal erythema ( mild PND) present. No oropharyngeal exudate.      Tonsils: No tonsillar abscesses.   Eyes:      General: Lids are normal. No scleral icterus.        Right eye: No discharge.         Left eye: No discharge.      Conjunctiva/sclera: Conjunctivae normal.   Pulmonary:      Effort: Pulmonary effort is normal. No respiratory distress.      Breath sounds: No decreased breath sounds, wheezing, rhonchi or rales.   Musculoskeletal:         General: Normal range of motion.      Cervical back: Neck supple.   Lymphadenopathy:      Cervical: Cervical adenopathy ( mild bilat) present.   Skin:     General: Skin is warm and dry.      Coloration: Skin is not pale.      Findings: No erythema.   Neurological:      Mental Status: She is alert and oriented to person, place, and time. She is not disoriented.   Psychiatric:         Speech: Speech normal.         Behavior: Behavior normal.         Assessment/Plan:   1. Otalgia, unspecified laterality    2. Dysfunction of right eustachian tube  - cetirizine-psuedoephedrine (ZYRTEC-D) 5-120 MG per tablet; Take 1 Tablet by mouth in the morning and 1 Tablet in the evening. Do all this for 30 days.  Dispense: 60 Tablet; Refill: 1  Supportive care is reviewed with patient/caregiver - recommend to push PO fluids and electrolytes, Nsaids/tylenol, netti pot/saline irrig, humidifier in home, flonase, antihistamine decongestant, contact clinic with lack of improvement over the next 7 days for ENT referral  Return to clinic with lack of resolution or progression of symptoms.    I have worn an N95 mask, gloves and eye protection for the entire encounter with this patient.     Differential diagnosis, natural history, supportive care, and indications for immediate follow-up discussed.

## 2022-07-23 ENCOUNTER — OFFICE VISIT (OUTPATIENT)
Dept: URGENT CARE | Facility: CLINIC | Age: 37
End: 2022-07-23
Payer: COMMERCIAL

## 2022-07-23 ENCOUNTER — TELEPHONE (OUTPATIENT)
Dept: URGENT CARE | Facility: CLINIC | Age: 37
End: 2022-07-23

## 2022-07-23 VITALS
HEART RATE: 93 BPM | DIASTOLIC BLOOD PRESSURE: 74 MMHG | WEIGHT: 215 LBS | RESPIRATION RATE: 14 BRPM | SYSTOLIC BLOOD PRESSURE: 124 MMHG | HEIGHT: 63 IN | OXYGEN SATURATION: 98 % | TEMPERATURE: 98.7 F | BODY MASS INDEX: 38.09 KG/M2

## 2022-07-23 DIAGNOSIS — Z88.0 PENICILLIN ALLERGY: ICD-10-CM
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
## 2022-07-23 PROCEDURE — 99213 OFFICE O/P EST LOW 20 MIN: CPT | Performed by: STUDENT IN AN ORGANIZED HEALTH CARE EDUCATION/TRAINING PROGRAM

## 2022-07-23 RX ORDER — SULFAMETHOXAZOLE AND TRIMETHOPRIM 800; 160 MG/1; MG/1
1 TABLET ORAL 2 TIMES DAILY
Qty: 10 TABLET | Refills: 0 | Status: SHIPPED | OUTPATIENT
Start: 2022-07-23 | End: 2022-07-28

## 2022-07-23 RX ORDER — METRONIDAZOLE 500 MG/1
500 TABLET ORAL 3 TIMES DAILY
Qty: 15 TABLET | Refills: 0 | Status: SHIPPED | OUTPATIENT
Start: 2022-07-23 | End: 2022-07-28

## 2022-07-23 RX ORDER — METRONIDAZOLE 500 MG/1
500 TABLET ORAL 3 TIMES DAILY
Qty: 15 TABLET | Refills: 0 | Status: SHIPPED
Start: 2022-07-23 | End: 2022-07-23

## 2022-07-23 ASSESSMENT — FIBROSIS 4 INDEX: FIB4 SCORE: 0.42

## 2022-07-23 NOTE — PROGRESS NOTES
Subjective:   CHIEF COMPLAINT  Chief Complaint   Patient presents with   • Cat Bite     Puncture wound left hand and right thumb last night. Cat is current on rabies vaccination. Last Tetnus 2014.       HPI  Gabidom Ruth is a 36 y.o. female who presents with a chief complaint of multiple cat bites to her bilateral hands.  Patient was over the cat.  Yesterday the cat tried escaping from the house to fight with the neighbors cat and the patient caught her cat on the way out of the house.  As she pinned the cat to the ground it subsequently started biting her hands.  The cats immunizations are up-to-date.  Patient's tetanus is also up-to-date in 2014.  Now the patient reports she is experiencing pain along the sites of the bite wounds.  Pain is slightly better today.  Left hand is worse than right.  She tried taking ibuprofen which provided nominal relief of symptoms.  Wounds were cleaned with soap and water yesterday.    REVIEW OF SYSTEMS  General: no fever or chills  GI: no nausea or vomiting  See HPI for further details.    PAST MEDICAL HISTORY  Patient Active Problem List    Diagnosis Date Noted   • Oral contraception initiation 01/19/2017   • PMS (premenstrual syndrome) 01/19/2017   • MVP (mitral valve prolapse) 01/19/2017       SURGICAL HISTORY  patient denies any surgical history    ALLERGIES  Allergies   Allergen Reactions   • Amoxicillin Hives       CURRENT MEDICATIONS  Home Medications     Reviewed by Cuco Flores D.O. (Physician) on 07/23/22 at 1052  Med List Status: <None>   Medication Last Dose Status   levonorgestrel-ethinyl estradiol (AVIANE) 0.1-20 MG-MCG per tablet  Active   levonorgestrel-ethinyl estradiol (SEASONALE) 0.15-0.03 MG per tablet Taking Active                SOCIAL HISTORY  Social History     Tobacco Use   • Smoking status: Former Smoker     Packs/day: 0.01     Types: Cigarettes   • Smokeless tobacco: Never Used   Vaping Use   • Vaping Use: Never used   Substance and Sexual  "Activity   • Alcohol use: Yes     Alcohol/week: 4.2 oz     Types: 7 Glasses of wine per week     Comment: social smoker   • Drug use: Yes     Types: Marijuana, Inhaled   • Sexual activity: Yes     Partners: Male     Birth control/protection: Pill       FAMILY HISTORY  Family History   Problem Relation Age of Onset   • Cancer Mother         brain tumor   • No Known Problems Father    • Stroke Paternal Grandfather           Objective:   PHYSICAL EXAM  VITAL SIGNS: /74 (BP Location: Left arm, Patient Position: Sitting, BP Cuff Size: Adult)   Pulse 93   Temp 37.1 °C (98.7 °F) (Temporal)   Resp 14   Ht 1.6 m (5' 3\")   Wt 97.5 kg (215 lb)   SpO2 98%   BMI 38.09 kg/m²     Gen: no acute distress, normal voice  Skin: dry, intact, moist mucosal membranes  Head: Atraumatic, normocephalic  Psych: normal affect, normal judgement, alert, awake  Musculoskeletal: Left hand: Scattered skin abrasions and puncture wounds on the dorsal aspect of the hand, most notable between MCPJ 2-3 without any surrounding erythema, streaking, induration or fluctuance.  Localized tenderness to palpation.  Appropriate range of motion.  Right hand: Scattered skin abrasions without any significant puncture wounds.  Good range of motion.  No surrounding erythema, induration or fluctuance of Wounds.      Assessment/Plan:     1. Cat bite, initial encounter  sulfamethoxazole-trimethoprim (BACTRIM DS) 800-160 MG tablet    metroNIDAZOLE (FLAGYL) 500 MG Tab   2. Penicillin allergy     Provoked.  Patient has penicillin allergies, so cannot place on Augmentin.  No obvious infection at this point but certainly she would benefit from PPx antibiotics.  Both cats immunizations and patient's immunizations are up-to-date.  - Ordered Rx for Bactrim to cover for Pasteurella  - Ordered Rx for metronidazole to cover for anaerobes  - Return to urgent care any new/worsening symptoms or further questions or concerns.  Patient understood everything discussed.  " All questions were answered.      Differential diagnosis, natural history, supportive care, and indications for immediate follow-up discussed. All questions answered. Patient agrees with the plan of care.    Follow-up as needed if symptoms worsen or fail to improve to PCP, Urgent care or Emergency Room.    Please note that this dictation was created using voice recognition software. I have made a reasonable attempt to correct obvious errors, but I expect that there are errors of grammar and possibly content that I did not discover before finalizing the note.

## 2022-07-23 NOTE — TELEPHONE ENCOUNTER
Hello , patient called and the [facility] pharmacy did not have the flagyl , and she would like you to send it to [facility] on [address] instead.     Thank you.

## 2022-11-11 ENCOUNTER — GYNECOLOGY VISIT (OUTPATIENT)
Dept: OBGYN | Facility: CLINIC | Age: 37
End: 2022-11-11
Payer: COMMERCIAL

## 2022-11-11 ENCOUNTER — HOSPITAL ENCOUNTER (OUTPATIENT)
Facility: MEDICAL CENTER | Age: 37
End: 2022-11-11
Attending: OBSTETRICS & GYNECOLOGY
Payer: COMMERCIAL

## 2022-11-11 DIAGNOSIS — N92.1 BREAKTHROUGH BLEEDING ON BIRTH CONTROL PILLS: ICD-10-CM

## 2022-11-11 DIAGNOSIS — Z01.419 WELL WOMAN EXAM WITH ROUTINE GYNECOLOGICAL EXAM: ICD-10-CM

## 2022-11-11 PROCEDURE — 87491 CHLMYD TRACH DNA AMP PROBE: CPT

## 2022-11-11 PROCEDURE — 87591 N.GONORRHOEAE DNA AMP PROB: CPT

## 2022-11-11 PROCEDURE — 87624 HPV HI-RISK TYP POOLED RSLT: CPT

## 2022-11-11 PROCEDURE — 99395 PREV VISIT EST AGE 18-39: CPT | Performed by: OBSTETRICS & GYNECOLOGY

## 2022-11-11 PROCEDURE — 88175 CYTOPATH C/V AUTO FLUID REDO: CPT

## 2022-11-11 ASSESSMENT — FIBROSIS 4 INDEX: FIB4 SCORE: 0.43

## 2022-11-11 NOTE — PROGRESS NOTES
Patient here for annual exam  Last pap done/result: unknown  LMP: unknown  BCM: OCP's, needs refill  Last mammogram, if applicable: n/a  Phone number: [phone] (home)   Pharmacy verified

## 2022-11-11 NOTE — PROGRESS NOTES
Gabi Ruth is a 37 y.o.  female who presents for her Annual Gynecologic Exam      HPI Comments: Pt presents for her annual well woman exam.   Patient has still been bleeding irregularly.  She is on generic Seasonale and states that after she gets to the second month of active pills, she starts to have a continuous month of bleeding.  She also has noticed a return in some migraines.  Previously, she had not had any migraines for over 3 years.    No LMP recorded (lmp unknown). (Menstrual status: Irregular Menses).    Review of Systems:   Pertinent positives documented in HPI and all other systems reviewed & are negative    All PMH, PSH, allergies, social history and FH reviewed and updated today:    PGYN Hx:  Menarche: 13; started KYLE at age 15.   Cycles irregular, bleeds for weeks or sometimes months  Sexually active with male partner, Lifetime partners 35  Birth control history: Umm, Seasonale, Norethindrone, Lutera .  STD hx: HSV  Last pap smear: ASCUS neg HPV in 2021, denies history of cervical biopsies or excisional procedures.  Positive hx Gardasil vaccination.   R/O PCOS workup: negative cysts on ultrasound, normal TSH, slightly elevated cholesterol and triglycerides, A1C 5.8. Reportedly normal testosterone levels by another Gyn provider.     OB History    Para Term  AB Living   0 0 0 0 0 0   SAB IAB Ectopic Molar Multiple Live Births   0 0 0 0 0 0        Past Medical History:   Diagnosis Date    Heart murmur        No past surgical history on file.    Medications:   Current Outpatient Medications Ordered in Epic   Medication Sig Dispense Refill    levonorgestrel-ethinyl estradiol (SEASONALE) 0.15-0.03 MG per tablet Take 1 Tablet by mouth every day. 84 Tablet 4    levonorgestrel-ethinyl estradiol (AVIANE) 0.1-20 MG-MCG per tablet Take 1 Tablet by mouth every day.       No current [facility]-ordered facility-administered medications on file.       Amoxicillin    Social History      Socioeconomic History    Marital status: Single   Tobacco Use    Smoking status: Former     Packs/day: 0.01     Types: Cigarettes    Smokeless tobacco: Never   Vaping Use    Vaping Use: Never used   Substance and Sexual Activity    Alcohol use: Yes     Alcohol/week: 4.2 oz     Types: 7 Glasses of wine per week     Comment: social smoker    Drug use: Yes     Types: Marijuana, Inhaled    Sexual activity: Yes     Partners: Male     Birth control/protection: Pill       Family History   Problem Relation Age of Onset    Cancer Mother         brain tumor    No Known Problems Father     Stroke Paternal Grandfather         Objective:   Vital measurements:  BP (P) 132/79 (BP Location: Left arm, Patient Position: Sitting)   Wt (P) 220 lb   LMP  (LMP Unknown)   BMI (P) 38.97 kg/m²   Body mass index is 38.97 kg/m² (pended). (Goal BM I>18 <25)    Physical Exam   Nursing note and vitals reviewed.  Constitutional: She is oriented to person, place, and time. She appears well-developed and well-nourished. No distress.     HEENT:   Head: Normocephalic and atraumatic.   Right Ear: External ear normal.   Left Ear: External ear normal.   Nose: Nose normal.   Eyes: Conjunctivae and EOM are normal. Pupils are equal, round, and reactive to light. No scleral icterus.     Neck: Normal range of motion. Neck supple. No tracheal deviation present. No thyromegaly present. No cervical or supraclavicular lymphadenopathy.    Pulmonary/Chest: Effort normal and breath sounds normal. No respiratory distress. She has no wheezes. She has no rales. She exhibits no tenderness.     Cardiovascular: Regular, rate and rhythm. No edema.    Breast: Symmetrical, normal consistency without masses.  Bilateral piercings in place without discharge.    Abdominal: Soft. Bowel sounds are normal. She exhibits no distension and no mass. No tenderness. She has no rebound and no guarding.     Genitourinary:  Pelvic exam was performed with patient supine.  External  genitalia with no abnormal pigmentation, labial fusion, rashes, tenderness, lesions or injury to the labia bilaterally.  BUS normal  Vagina is pink and moist with no lesions, foul discharge, erythema, tenderness or bleeding. No foreign body around the vagina or signs of injury.   Cervix exhibits no motion tenderness, no discharge and no friability, no lesions.   Uterus is small not deviated, not enlarged, not fixed and not tender.  Right adnexa displays no mass, no tenderness and no fullness.  Left adnexa displays no mass, no tenderness and no fullness.     Musculoskeletal: Normal range of motion. Non tender. She exhibits no edema and no tenderness.     Lymphadenopathy: She has no cervical or supraclavicular adenopathy.     Neurological: She is alert and oriented to person, place, and time. She exhibits normal muscle tone.     Skin: Skin is warm and dry. No rash noted. She is not diaphoretic. No erythema. No pallor.     Psychiatric: She has a normal mood and affect. Her behavior is normal. Judgment and thought content normal.     Pelvic and breast exams chaperoned by MA.     Assessment:     1. Well woman exam with routine gynecological exam  THINPREP PAP W/HPV AND CTNG      2. Breakthrough bleeding on birth control pills            Plan:     #Birth control.  I discussed with the patient that at this point because of her migraines with ocular component, it would be best if she stops combination oral contraceptives altogether.  We discussed briefly options of progesterone only, Nexplanon, Depo-Provera, and IUD.  At this point, patient would like to come off of all hormonal birth control in order to see if her cycles are regulating themselves.  She is going to use condoms for contraception.  We also briefly discussed the possibility of surgical intervention with endometrial ablation or D&C.  Patient does not desire any childbearing.  She states she would be amenable to some of the procedures if her periods are not  normal.     #Well Woman  - Pap and physical exam performed; discussed pap smear screening guidelines  - Self breast awareness discussed.  - Encouraged exercise and proper diet.  - Mammograms starting @ age 40 annually.  - See medications and orders placed in encounter report.  - Follow up in 1 year for annual exam or sooner as needed

## 2022-11-14 LAB
C TRACH DNA GENITAL QL NAA+PROBE: NEGATIVE
CYTOLOGY REG CYTOL: NORMAL
HPV HR 12 DNA CVX QL NAA+PROBE: NEGATIVE
HPV16 DNA SPEC QL NAA+PROBE: NEGATIVE
HPV18 DNA SPEC QL NAA+PROBE: NEGATIVE
N GONORRHOEA DNA GENITAL QL NAA+PROBE: NEGATIVE
SPECIMEN SOURCE: NORMAL
SPECIMEN SOURCE: NORMAL

## 2022-11-18 ENCOUNTER — OFFICE VISIT (OUTPATIENT)
Dept: URGENT CARE | Facility: PHYSICIAN GROUP | Age: 37
End: 2022-11-18
Payer: COMMERCIAL

## 2022-11-18 VITALS
SYSTOLIC BLOOD PRESSURE: 116 MMHG | BODY MASS INDEX: 37.56 KG/M2 | TEMPERATURE: 97.2 F | OXYGEN SATURATION: 99 % | DIASTOLIC BLOOD PRESSURE: 72 MMHG | WEIGHT: 220 LBS | HEIGHT: 64 IN | RESPIRATION RATE: 14 BRPM | HEART RATE: 91 BPM

## 2022-11-18 DIAGNOSIS — J02.9 SORE THROAT: ICD-10-CM

## 2022-11-18 DIAGNOSIS — J02.0 STREP PHARYNGITIS: ICD-10-CM

## 2022-11-18 LAB
INT CON NEG: NORMAL
INT CON POS: NORMAL
S PYO AG THROAT QL: POSITIVE

## 2022-11-18 PROCEDURE — 87880 STREP A ASSAY W/OPTIC: CPT

## 2022-11-18 PROCEDURE — 99213 OFFICE O/P EST LOW 20 MIN: CPT

## 2022-11-18 RX ORDER — CEPHALEXIN 500 MG/1
500 CAPSULE ORAL 2 TIMES DAILY
Qty: 20 CAPSULE | Refills: 0 | Status: SHIPPED | OUTPATIENT
Start: 2022-11-18 | End: 2022-11-28

## 2022-11-18 ASSESSMENT — ENCOUNTER SYMPTOMS
MYALGIAS: 0
SORE THROAT: 1
CHILLS: 0
FEVER: 0
COUGH: 0

## 2022-11-18 ASSESSMENT — FIBROSIS 4 INDEX: FIB4 SCORE: 0.43

## 2022-11-19 NOTE — PROGRESS NOTES
Subjective     Lakeisha Ruth is a 37 y.o. female who presents with Pharyngitis            HPI    Patient presents with symptoms that started this morning.  She endorses sore throat, which she describes as painful swallowing.  She further endorses fatigue, but otherwise denies any fever, chills, nasal congestion, cough, or myalgias.  She has been doing warm saline gargles, providing some relief.  She reports having had a dental a procedure 4 days ago.  She reports history of mitral valve prolapse.    Patient's current problem list, medications, and past medical/surgical history were reviewed in Epic.    PMH:  has a past medical history of Heart murmur.    She has no past medical history of Addisons disease ([facility]), Adrenal disorder ([facility]), Allergy, Anemia, Anxiety, Arrhythmia, Arthritis, Asthma, Blood transfusion without reported diagnosis, Cancer ([facility]), Cataract, CHF (congestive heart failure) ([facility]), Clotting disorder ([facility]), COPD (chronic obstructive pulmonary disease) ([facility]), Cushings syndrome ([facility]), Depression, Diabetes ([facility]), Diabetic neuropathy ([facility]), GERD (gastroesophageal reflux disease), Glaucoma, Goiter, Head ache, Heart attack ([facility]), HIV (human immunodeficiency virus infection) ([facility]), Hyperlipidemia, Hypertension, IBD (inflammatory bowel disease), Kidney disease, Meningitis, Migraine, Muscle disorder, Osteoporosis, Parathyroid disorder ([facility]), Pituitary disease ([facility]), Pulmonary emphysema ([facility]), Seizure ([facility]), Sickle cell disease ([facility]), Stroke ([facility]), Substance abuse ([facility]), Thyroid disease, Tuberculosis, or Urinary tract infection.  MEDS:   Current Outpatient Medications:     levonorgestrel-ethinyl estradiol (SEASONALE) 0.15-0.03 MG per tablet, Take 1 Tablet by mouth every day., Disp: 84 Tablet, Rfl: 4  ALLERGIES:   Allergies   Allergen Reactions    Amoxicillin Hives     SURGHX: History reviewed. No pertinent surgical history.  SOCHX:  reports that she has quit smoking. Her smoking use included cigarettes.  "She smoked an average of .01 packs per day. She has never used smokeless tobacco. She reports current alcohol use of about 4.2 oz per week. She reports current drug use. Drugs: Marijuana and Inhaled.  FH: Reviewed with patient, not pertinent to this visit.       Review of Systems   Constitutional:  Positive for malaise/fatigue. Negative for chills and fever.   HENT:  Positive for sore throat. Negative for congestion.    Respiratory:  Negative for cough.    Musculoskeletal:  Negative for myalgias.            Objective     /72 (BP Location: Right arm, Patient Position: Sitting, BP Cuff Size: Adult long)   Pulse 91   Temp 36.2 °C (97.2 °F) (Temporal)   Resp 14   Ht 1.626 m (5' 4\")   Wt 99.8 kg (220 lb)   LMP  (LMP Unknown)   SpO2 99%   BMI 37.76 kg/m²      Physical Exam  Constitutional:       Appearance: Normal appearance.   HENT:      Head: Normocephalic.      Nose: Nose normal.      Mouth/Throat:      Pharynx: Pharyngeal swelling and posterior oropharyngeal erythema present.   Eyes:      Extraocular Movements: Extraocular movements intact.   Cardiovascular:      Rate and Rhythm: Normal rate and regular rhythm.      Pulses: Normal pulses.      Heart sounds: Normal heart sounds.   Pulmonary:      Effort: Pulmonary effort is normal.      Breath sounds: Normal breath sounds.   Musculoskeletal:         General: Normal range of motion.      Cervical back: Normal range of motion.   Skin:     General: Skin is warm.   Neurological:      General: No focal deficit present.      Mental Status: She is alert.   Psychiatric:         Mood and Affect: Mood normal.         Behavior: Behavior normal.     Results:    Rapid strep a-positive           Assessment & Plan        1. Strep pharyngitis    - cephALEXin (KEFLEX) 500 MG Cap; Take 1 Capsule by mouth 2 times a day for 10 days.  Dispense: 20 Capsule; Refill: 0    2. Sore throat    - POCT Rapid Strep A    Patient tested positive for strep A.  Her presentation is " consistent with strep pharyngitis. She is prescribed cephalexin twice daily for 10 days.  She reports that she had tolerated this in the past.  She is advised to complete antibiotics for 10 days, even if she is feeling better.  Advised on symptomatic treatment at home.  May take ibuprofen or Tylenol as needed for pain relief.  Warm saline gargles frequently for sore throat.  Discussed treatment plan with patient, she is agreeable and verbalized understanding.  Educated patient on signs and symptoms watch out for, when to return to the clinic or go to the ER.    Electronically Signed by GERALDINE Baez

## 2023-03-15 ENCOUNTER — OFFICE VISIT (OUTPATIENT)
Dept: MEDICAL GROUP | Facility: IMAGING CENTER | Age: 38
End: 2023-03-15
Payer: COMMERCIAL

## 2023-03-15 VITALS
TEMPERATURE: 97.7 F | DIASTOLIC BLOOD PRESSURE: 84 MMHG | SYSTOLIC BLOOD PRESSURE: 118 MMHG | HEART RATE: 101 BPM | WEIGHT: 225.6 LBS | HEIGHT: 64 IN | OXYGEN SATURATION: 97 % | RESPIRATION RATE: 16 BRPM | BODY MASS INDEX: 38.51 KG/M2

## 2023-03-15 DIAGNOSIS — Z11.59 NEED FOR HEPATITIS C SCREENING TEST: ICD-10-CM

## 2023-03-15 DIAGNOSIS — Z23 NEED FOR VACCINATION: ICD-10-CM

## 2023-03-15 DIAGNOSIS — Z13.6 SCREENING FOR CARDIOVASCULAR CONDITION: ICD-10-CM

## 2023-03-15 DIAGNOSIS — K92.1 HEMATOCHEZIA: ICD-10-CM

## 2023-03-15 DIAGNOSIS — Z13.31 DEPRESSION SCREENING: ICD-10-CM

## 2023-03-15 DIAGNOSIS — E66.9 CLASS 2 OBESITY WITHOUT SERIOUS COMORBIDITY WITH BODY MASS INDEX (BMI) OF 38.0 TO 38.9 IN ADULT, UNSPECIFIED OBESITY TYPE: ICD-10-CM

## 2023-03-15 DIAGNOSIS — Z87.42 H/O METRORRHAGIA: ICD-10-CM

## 2023-03-15 DIAGNOSIS — Z76.89 ENCOUNTER TO ESTABLISH CARE WITH NEW DOCTOR: ICD-10-CM

## 2023-03-15 DIAGNOSIS — Z13.1 DIABETES MELLITUS SCREENING: ICD-10-CM

## 2023-03-15 DIAGNOSIS — K59.09 OTHER CONSTIPATION: ICD-10-CM

## 2023-03-15 PROBLEM — Z30.011 ORAL CONTRACEPTION INITIATION: Status: RESOLVED | Noted: 2017-01-19 | Resolved: 2023-03-15

## 2023-03-15 PROCEDURE — 90471 IMMUNIZATION ADMIN: CPT | Performed by: CLINICAL NURSE SPECIALIST

## 2023-03-15 PROCEDURE — 99204 OFFICE O/P NEW MOD 45 MIN: CPT | Mod: 25 | Performed by: CLINICAL NURSE SPECIALIST

## 2023-03-15 PROCEDURE — 90715 TDAP VACCINE 7 YRS/> IM: CPT | Performed by: CLINICAL NURSE SPECIALIST

## 2023-03-15 SDOH — ECONOMIC STABILITY: TRANSPORTATION INSECURITY
IN THE PAST 12 MONTHS, HAS LACK OF RELIABLE TRANSPORTATION KEPT YOU FROM MEDICAL APPOINTMENTS, MEETINGS, WORK OR FROM GETTING THINGS NEEDED FOR DAILY LIVING?: NO

## 2023-03-15 SDOH — ECONOMIC STABILITY: HOUSING INSECURITY
IN THE LAST 12 MONTHS, WAS THERE A TIME WHEN YOU DID NOT HAVE A STEADY PLACE TO SLEEP OR SLEPT IN A SHELTER (INCLUDING NOW)?: NO

## 2023-03-15 SDOH — ECONOMIC STABILITY: FOOD INSECURITY: WITHIN THE PAST 12 MONTHS, YOU WORRIED THAT YOUR FOOD WOULD RUN OUT BEFORE YOU GOT MONEY TO BUY MORE.: NEVER TRUE

## 2023-03-15 SDOH — ECONOMIC STABILITY: INCOME INSECURITY: HOW HARD IS IT FOR YOU TO PAY FOR THE VERY BASICS LIKE FOOD, HOUSING, MEDICAL CARE, AND HEATING?: NOT VERY HARD

## 2023-03-15 SDOH — ECONOMIC STABILITY: FOOD INSECURITY: WITHIN THE PAST 12 MONTHS, THE FOOD YOU BOUGHT JUST DIDN'T LAST AND YOU DIDN'T HAVE MONEY TO GET MORE.: NEVER TRUE

## 2023-03-15 SDOH — ECONOMIC STABILITY: INCOME INSECURITY: IN THE LAST 12 MONTHS, WAS THERE A TIME WHEN YOU WERE NOT ABLE TO PAY THE MORTGAGE OR RENT ON TIME?: NO

## 2023-03-15 SDOH — ECONOMIC STABILITY: HOUSING INSECURITY: IN THE LAST 12 MONTHS, HOW MANY PLACES HAVE YOU LIVED?: 1

## 2023-03-15 SDOH — HEALTH STABILITY: PHYSICAL HEALTH: ON AVERAGE, HOW MANY MINUTES DO YOU ENGAGE IN EXERCISE AT THIS LEVEL?: 30 MIN

## 2023-03-15 SDOH — ECONOMIC STABILITY: TRANSPORTATION INSECURITY
IN THE PAST 12 MONTHS, HAS LACK OF TRANSPORTATION KEPT YOU FROM MEETINGS, WORK, OR FROM GETTING THINGS NEEDED FOR DAILY LIVING?: NO

## 2023-03-15 SDOH — HEALTH STABILITY: PHYSICAL HEALTH: ON AVERAGE, HOW MANY DAYS PER WEEK DO YOU ENGAGE IN MODERATE TO STRENUOUS EXERCISE (LIKE A BRISK WALK)?: 5 DAYS

## 2023-03-15 SDOH — ECONOMIC STABILITY: TRANSPORTATION INSECURITY
IN THE PAST 12 MONTHS, HAS THE LACK OF TRANSPORTATION KEPT YOU FROM MEDICAL APPOINTMENTS OR FROM GETTING MEDICATIONS?: NO

## 2023-03-15 SDOH — HEALTH STABILITY: MENTAL HEALTH
STRESS IS WHEN SOMEONE FEELS TENSE, NERVOUS, ANXIOUS, OR CAN'T SLEEP AT NIGHT BECAUSE THEIR MIND IS TROUBLED. HOW STRESSED ARE YOU?: VERY MUCH

## 2023-03-15 ASSESSMENT — SOCIAL DETERMINANTS OF HEALTH (SDOH)
IN A TYPICAL WEEK, HOW MANY TIMES DO YOU TALK ON THE PHONE WITH FAMILY, FRIENDS, OR NEIGHBORS?: ONCE A WEEK
DO YOU BELONG TO ANY CLUBS OR ORGANIZATIONS SUCH AS CHURCH GROUPS UNIONS, FRATERNAL OR ATHLETIC GROUPS, OR SCHOOL GROUPS?: NO
HOW OFTEN DO YOU GET TOGETHER WITH FRIENDS OR RELATIVES?: ONCE A WEEK
WITHIN THE PAST 12 MONTHS, YOU WORRIED THAT YOUR FOOD WOULD RUN OUT BEFORE YOU GOT THE MONEY TO BUY MORE: NEVER TRUE
HOW OFTEN DO YOU ATTEND CHURCH OR RELIGIOUS SERVICES?: NEVER
HOW HARD IS IT FOR YOU TO PAY FOR THE VERY BASICS LIKE FOOD, HOUSING, MEDICAL CARE, AND HEATING?: NOT VERY HARD
ARE YOU MARRIED, WIDOWED, DIVORCED, SEPARATED, NEVER MARRIED, OR LIVING WITH A PARTNER?: LIVING WITH PARTNER
ARE YOU MARRIED, WIDOWED, DIVORCED, SEPARATED, NEVER MARRIED, OR LIVING WITH A PARTNER?: LIVING WITH PARTNER
HOW OFTEN DO YOU ATTEND CHURCH OR RELIGIOUS SERVICES?: NEVER
HOW OFTEN DO YOU ATTENT MEETINGS OF THE CLUB OR ORGANIZATION YOU BELONG TO?: PATIENT DECLINED
DO YOU BELONG TO ANY CLUBS OR ORGANIZATIONS SUCH AS CHURCH GROUPS UNIONS, FRATERNAL OR ATHLETIC GROUPS, OR SCHOOL GROUPS?: NO
HOW MANY DRINKS CONTAINING ALCOHOL DO YOU HAVE ON A TYPICAL DAY WHEN YOU ARE DRINKING: PATIENT DOES NOT DRINK
HOW OFTEN DO YOU HAVE SIX OR MORE DRINKS ON ONE OCCASION: NEVER
HOW OFTEN DO YOU HAVE A DRINK CONTAINING ALCOHOL: NEVER
HOW OFTEN DO YOU GET TOGETHER WITH FRIENDS OR RELATIVES?: ONCE A WEEK
IN A TYPICAL WEEK, HOW MANY TIMES DO YOU TALK ON THE PHONE WITH FAMILY, FRIENDS, OR NEIGHBORS?: ONCE A WEEK
HOW OFTEN DO YOU ATTENT MEETINGS OF THE CLUB OR ORGANIZATION YOU BELONG TO?: PATIENT DECLINED

## 2023-03-15 ASSESSMENT — LIFESTYLE VARIABLES
HOW OFTEN DO YOU HAVE A DRINK CONTAINING ALCOHOL: NEVER
HOW MANY STANDARD DRINKS CONTAINING ALCOHOL DO YOU HAVE ON A TYPICAL DAY: PATIENT DOES NOT DRINK
SKIP TO QUESTIONS 9-10: 1
HOW OFTEN DO YOU HAVE SIX OR MORE DRINKS ON ONE OCCASION: NEVER
AUDIT-C TOTAL SCORE: 0

## 2023-03-15 ASSESSMENT — PATIENT HEALTH QUESTIONNAIRE - PHQ9
5. POOR APPETITE OR OVEREATING: 0 - NOT AT ALL
SUM OF ALL RESPONSES TO PHQ QUESTIONS 1-9: 5
CLINICAL INTERPRETATION OF PHQ2 SCORE: 2

## 2023-03-15 ASSESSMENT — PAIN SCALES - GENERAL: PAINLEVEL: NO PAIN

## 2023-03-15 ASSESSMENT — FIBROSIS 4 INDEX: FIB4 SCORE: 0.43

## 2023-03-15 NOTE — ASSESSMENT & PLAN NOTE
Had menses around 3/1 and had constipation which was unusual for a couple of days, with anal itching and a spot of blood on toilet paper since that time.  Had food poisoning with diarrhea and vomiting which resolved within 24 hours.  Can have pain with BM.  Has been taking metamucil and hydrating well which helps. No blood for the last couple of days but had larger BM today and had a spot of blood on tissue. Has had BM daily since that time.

## 2023-03-15 NOTE — PROGRESS NOTES
"Subjective     Lakeisha Ruth is a 37 y.o. female who presents with Establish Care (Prior PCP- Dr. Erick Hawley ), Other (Pt states she has been having bloody stools for the last two weeks. Pt states she has discomfort and it is sometimes painful. ), Constipation (A little bit of constipation ), and Bloated            HPI  Other constipation  Had menses around 3/1 and had constipation which was unusual for a couple of days, with anal itching and a spot of blood on toilet paper since that time.  Had food poisoning with diarrhea and vomiting which resolved within 24 hours.  Can have pain with BM.  Has been taking metamucil and hydrating well which helps. No blood for the last couple of days but had larger BM today and had a spot of blood on tissue. Has had BM daily since that time.     ROS  See HPI    Allergies   Allergen Reactions    Amoxicillin Hives     Current Outpatient Medications on File Prior to Visit   Medication Sig Dispense Refill    METAMUCIL FIBER PO Take  by mouth.       No current facility-administered medications on file prior to visit.              Objective     /84 (BP Location: Left arm, Patient Position: Sitting, BP Cuff Size: Adult)   Pulse (!) 101   Temp 36.5 °C (97.7 °F) (Temporal)   Resp 16   Ht 1.626 m (5' 4\")   Wt 102 kg (225 lb 9.6 oz)   LMP 02/25/2023 (Approximate)   SpO2 97%   BMI 38.72 kg/m²      Physical Exam  Exam conducted with a chaperone present.   Constitutional:       General: She is not in acute distress.     Appearance: Normal appearance. She is not ill-appearing, toxic-appearing or diaphoretic.   HENT:      Head: Normocephalic and atraumatic.   Eyes:      General: No scleral icterus.     Extraocular Movements: Extraocular movements intact.      Pupils: Pupils are equal, round, and reactive to light.   Cardiovascular:      Rate and Rhythm: Normal rate.   Pulmonary:      Effort: Pulmonary effort is normal.   Genitourinary:     Rectum: Internal hemorrhoid " present. No mass, tenderness, anal fissure or external hemorrhoid. Normal anal tone.   Skin:     General: Skin is warm and dry.   Neurological:      Mental Status: She is alert and oriented to person, place, and time.      Gait: Gait normal.   Psychiatric:         Mood and Affect: Mood normal.         Behavior: Behavior normal.         Thought Content: Thought content normal.         Judgment: Judgment normal.                           Assessment & Plan      1. Encounter to establish care with new doctor  History reviewed    2. Depression screening      3. Other constipation  New issue. Occurred 3 weeks ago but regular daily BM's since.  Small spot of blood on tissue likely related to internal hemorrhoids. Mother had brain tumor.  Referral to GI placed.     CONTINUE Metamucil    - Referral to Gastroenterology    4. Hematochezia  New issue. Mild with small spot of blood on tissue only.    - Referral to Gastroenterology    5. Need for vaccination    - Tdap Vaccine =>8YO IM    6. Need for hepatitis C screening test    - HCV Scrn ( [id number] 1xLife); Future    7. Diabetes mellitus screening    - Comp Metabolic Panel; Future  - HEMOGLOBIN A1C; Future    8. Screening for cardiovascular condition    - CBC WITH DIFFERENTIAL; Future  - Lipid Profile; Future  - TSH WITH REFLEX TO FT4; Future  - VITAMIN D,25 HYDROXY (DEFICIENCY); Future    9. Class 2 obesity without serious comorbidity with body mass index (BMI) of 38.0 to 38.9 in adult, unspecified obesity type  Chronic, uncontrolled. Lakeisha exercises regularly but reports she is unable to lose weight.  Labs ordered.     - Comp Metabolic Panel; Future  - Lipid Profile; Future  - TSH WITH REFLEX TO FT4; Future  - TESTOSTERONE F&T FEMALES/CHILD; Future    10. H/O metrorrhagia  Reports metrorrhagia as a teen.  Started on BCP at age 15 and stopped 3 months ago. Now menses regular but she has moments of feeling enraged.    - TESTOSTERONE F&T FEMALES/CHILD; Future    Return if  symptoms worsen or fail to improve, for With test results.

## 2023-03-29 ENCOUNTER — OFFICE VISIT (OUTPATIENT)
Dept: URGENT CARE | Facility: CLINIC | Age: 38
End: 2023-03-29
Payer: COMMERCIAL

## 2023-03-29 VITALS
HEART RATE: 91 BPM | SYSTOLIC BLOOD PRESSURE: 110 MMHG | TEMPERATURE: 97.9 F | DIASTOLIC BLOOD PRESSURE: 80 MMHG | HEIGHT: 64 IN | BODY MASS INDEX: 36.7 KG/M2 | OXYGEN SATURATION: 100 % | WEIGHT: 215 LBS | RESPIRATION RATE: 16 BRPM

## 2023-03-29 DIAGNOSIS — J02.9 VIRAL PHARYNGITIS: ICD-10-CM

## 2023-03-29 DIAGNOSIS — J02.9 SORE THROAT: ICD-10-CM

## 2023-03-29 LAB — S PYO DNA SPEC NAA+PROBE: NOT DETECTED

## 2023-03-29 PROCEDURE — 99213 OFFICE O/P EST LOW 20 MIN: CPT | Performed by: STUDENT IN AN ORGANIZED HEALTH CARE EDUCATION/TRAINING PROGRAM

## 2023-03-29 PROCEDURE — 87651 STREP A DNA AMP PROBE: CPT | Performed by: STUDENT IN AN ORGANIZED HEALTH CARE EDUCATION/TRAINING PROGRAM

## 2023-03-29 ASSESSMENT — FIBROSIS 4 INDEX: FIB4 SCORE: 0.43

## 2023-03-29 NOTE — PROGRESS NOTES
Subjective:   CHIEF COMPLAINT  Chief Complaint   Patient presents with    Sore Throat     X3 days       HPI  Gabi Ruth is a 37 y.o. female who presents with a chief complaint of headache, sore throat and dry cough x3 days.  Advil DayQuil at provided some relief.  No fevers, body aches, nausea, or vomiting.  No sick contacts.  Home COVID test x2 days have been negative.    REVIEW OF SYSTEMS  General: no fever or chills  GI: no nausea or vomiting  See HPI for further details.    PAST MEDICAL HISTORY  Patient Active Problem List    Diagnosis Date Noted    Other constipation 03/15/2023    H/O metrorrhagia 03/15/2023    PMS (premenstrual syndrome) 2017    MVP (mitral valve prolapse) 2017       SURGICAL HISTORY  patient denies any surgical history    ALLERGIES  Allergies   Allergen Reactions    Amoxicillin Hives       CURRENT MEDICATIONS  Home Medications       Reviewed by Cuco Flores D.O. (Physician) on 23 at 0857  Med List Status: <None>     Medication Last Dose Status   METAMUCIL FIBER PO Not Taking Active                    SOCIAL HISTORY  Social History     Tobacco Use    Smoking status: Some Days     Types: Electronic Cigarettes    Smokeless tobacco: Never    Tobacco comments:     Smoked cigarettes starting at 19 and quit on and off, fully in 2020. Use e cigarette when stressed   Vaping Use    Vaping Use: Never used   Substance and Sexual Activity    Alcohol use: Not Currently    Drug use: Yes     Frequency: 1.0 times per week     Types: Marijuana     Comment: rarely    Sexual activity: Yes     Partners: Male     Birth control/protection: Rhythm       FAMILY HISTORY  Family History   Problem Relation Age of Onset    Cancer Mother             No Known Problems Father     Alcohol abuse Paternal Grandmother     Alcohol abuse Paternal Grandfather     Stroke Paternal Grandfather           Objective:   PHYSICAL EXAM  VITAL SIGNS: /80 (BP Location: Left arm, Patient  "Position: Sitting, BP Cuff Size: Large adult)   Pulse 91   Temp 36.6 °C (97.9 °F) (Temporal)   Resp 16   Ht 1.626 m (5' 4\")   Wt 97.5 kg (215 lb)   LMP 02/25/2023 (Approximate)   SpO2 100%   BMI 36.90 kg/m²     Gen: no acute distress, normal voice  Skin: dry, intact, moist mucosal membranes  Eyes: No conjunctival injection b/l  Neck: Normal range of motion. No meningeal signs.   ENT: Minimal posterior oropharyngeal erythema without exudates.  Even midline.  Bilateral anterior cervical lymphadenopathy.  Lungs: No increased work of breathing.  CTAB w/ symmetric expansion  CV: RRR w/o murmurs or clicks  Psych: normal affect, normal judgement, alert, awake    POC strep: Negative    Assessment/Plan:     1. Sore throat  POCT GROUP A STREP, PCR      2. Viral pharyngitis        Viral etiology should be self-limiting.  Recommend symptomatic treatment with OTCs as needed.  Provided a note for work.  Return to urgent care any new/worsening symptoms or further questions or concerns.  Patient understood everything discussed.  All questions were answered.      Differential diagnosis, natural history, supportive care, and indications for immediate follow-up discussed. All questions answered. Patient agrees with the plan of care.    Follow-up as needed if symptoms worsen or fail to improve to PCP, Urgent care or Emergency Room.    Please note that this dictation was created using voice recognition software. I have made a reasonable attempt to correct obvious errors, but I expect that there are errors of grammar and possibly content that I did not discover before finalizing the note.         "

## 2023-03-29 NOTE — LETTER
March 29, 2023       Patient: Gabi Ruth   YOB: 1985   Date of Visit: 3/29/2023         To Whom It May Concern:    Gabi Ruth was seen in urgent care today and is cleared to return to work on Friday.    If you have any questions or concerns, please don't hesitate to call [phone]          Sincerely,          Cuco Flores D.O.  Electronically Signed

## 2023-04-14 ENCOUNTER — HOSPITAL ENCOUNTER (OUTPATIENT)
Dept: LAB | Facility: MEDICAL CENTER | Age: 38
End: 2023-04-14
Attending: CLINICAL NURSE SPECIALIST
Payer: COMMERCIAL

## 2023-04-14 DIAGNOSIS — Z13.6 SCREENING FOR CARDIOVASCULAR CONDITION: ICD-10-CM

## 2023-04-14 DIAGNOSIS — Z13.1 DIABETES MELLITUS SCREENING: ICD-10-CM

## 2023-04-14 DIAGNOSIS — Z11.59 NEED FOR HEPATITIS C SCREENING TEST: ICD-10-CM

## 2023-04-14 DIAGNOSIS — E66.9 CLASS 2 OBESITY WITHOUT SERIOUS COMORBIDITY WITH BODY MASS INDEX (BMI) OF 38.0 TO 38.9 IN ADULT, UNSPECIFIED OBESITY TYPE: ICD-10-CM

## 2023-04-14 DIAGNOSIS — Z87.42 H/O METRORRHAGIA: ICD-10-CM

## 2023-04-14 LAB
ALBUMIN SERPL BCP-MCNC: 4.2 G/DL (ref 3.2–4.9)
ALBUMIN/GLOB SERPL: 1.2 G/DL
ALP SERPL-CCNC: 95 U/L (ref 30–99)
ALT SERPL-CCNC: 20 U/L (ref 2–50)
ANION GAP SERPL CALC-SCNC: 13 MMOL/L (ref 7–16)
AST SERPL-CCNC: 18 U/L (ref 12–45)
BASOPHILS # BLD AUTO: 0.8 % (ref 0–1.8)
BASOPHILS # BLD: 0.07 K/UL (ref 0–0.12)
BILIRUB SERPL-MCNC: 0.4 MG/DL (ref 0.1–1.5)
BUN SERPL-MCNC: 11 MG/DL (ref 8–22)
CALCIUM ALBUM COR SERPL-MCNC: 8.8 MG/DL (ref 8.5–10.5)
CALCIUM SERPL-MCNC: 9 MG/DL (ref 8.5–10.5)
CHLORIDE SERPL-SCNC: 104 MMOL/L (ref 96–112)
CHOLEST SERPL-MCNC: 201 MG/DL (ref 100–199)
CO2 SERPL-SCNC: 23 MMOL/L (ref 20–33)
CREAT SERPL-MCNC: 0.79 MG/DL (ref 0.5–1.4)
EOSINOPHIL # BLD AUTO: 0.07 K/UL (ref 0–0.51)
EOSINOPHIL NFR BLD: 0.8 % (ref 0–6.9)
ERYTHROCYTE [DISTWIDTH] IN BLOOD BY AUTOMATED COUNT: 41.6 FL (ref 35.9–50)
GFR SERPLBLD CREATININE-BSD FMLA CKD-EPI: 98 ML/MIN/1.73 M 2
GLOBULIN SER CALC-MCNC: 3.4 G/DL (ref 1.9–3.5)
GLUCOSE SERPL-MCNC: 94 MG/DL (ref 65–99)
HCT VFR BLD AUTO: 42.8 % (ref 37–47)
HCV AB SER QL: NORMAL
HDLC SERPL-MCNC: 67 MG/DL
HGB BLD-MCNC: 14.1 G/DL (ref 12–16)
IMM GRANULOCYTES # BLD AUTO: 0.03 K/UL (ref 0–0.11)
IMM GRANULOCYTES NFR BLD AUTO: 0.4 % (ref 0–0.9)
LDLC SERPL CALC-MCNC: 124 MG/DL
LYMPHOCYTES # BLD AUTO: 2.46 K/UL (ref 1–4.8)
LYMPHOCYTES NFR BLD: 28.9 % (ref 22–41)
MCH RBC QN AUTO: 28.6 PG (ref 27–33)
MCHC RBC AUTO-ENTMCNC: 32.9 G/DL (ref 33.6–35)
MCV RBC AUTO: 86.8 FL (ref 81.4–97.8)
MONOCYTES # BLD AUTO: 0.5 K/UL (ref 0–0.85)
MONOCYTES NFR BLD AUTO: 5.9 % (ref 0–13.4)
NEUTROPHILS # BLD AUTO: 5.37 K/UL (ref 2–7.15)
NEUTROPHILS NFR BLD: 63.2 % (ref 44–72)
NRBC # BLD AUTO: 0 K/UL
NRBC BLD-RTO: 0 /100 WBC
PLATELET # BLD AUTO: 355 K/UL (ref 164–446)
PMV BLD AUTO: 10.4 FL (ref 9–12.9)
POTASSIUM SERPL-SCNC: 4.4 MMOL/L (ref 3.6–5.5)
PROT SERPL-MCNC: 7.6 G/DL (ref 6–8.2)
RBC # BLD AUTO: 4.93 M/UL (ref 4.2–5.4)
SODIUM SERPL-SCNC: 140 MMOL/L (ref 135–145)
TRIGL SERPL-MCNC: 52 MG/DL (ref 0–149)
TSH SERPL DL<=0.005 MIU/L-ACNC: 0.75 UIU/ML (ref 0.38–5.33)
WBC # BLD AUTO: 8.5 K/UL (ref 4.8–10.8)

## 2023-04-14 PROCEDURE — 83036 HEMOGLOBIN GLYCOSYLATED A1C: CPT

## 2023-04-14 PROCEDURE — 85025 COMPLETE CBC W/AUTO DIFF WBC: CPT

## 2023-04-14 PROCEDURE — 84402 ASSAY OF FREE TESTOSTERONE: CPT

## 2023-04-14 PROCEDURE — 36415 COLL VENOUS BLD VENIPUNCTURE: CPT

## 2023-04-14 PROCEDURE — 84443 ASSAY THYROID STIM HORMONE: CPT

## 2023-04-14 PROCEDURE — 84270 ASSAY OF SEX HORMONE GLOBUL: CPT

## 2023-04-14 PROCEDURE — 86803 HEPATITIS C AB TEST: CPT

## 2023-04-14 PROCEDURE — 80061 LIPID PANEL: CPT

## 2023-04-14 PROCEDURE — 84403 ASSAY OF TOTAL TESTOSTERONE: CPT

## 2023-04-14 PROCEDURE — 80053 COMPREHEN METABOLIC PANEL: CPT

## 2023-04-15 LAB
EST. AVERAGE GLUCOSE BLD GHB EST-MCNC: 126 MG/DL
HBA1C MFR BLD: 6 % (ref 4–5.6)

## 2023-04-21 LAB
SHBG SERPL-SCNC: 48 NMOL/L (ref 25–122)
TESTOST FREE SERPL-MCNC: 3.9 PG/ML (ref 1.3–9.2)
TESTOST SERPL-MCNC: 29 NG/DL (ref 9–55)

## 2024-03-05 ENCOUNTER — HOSPITAL ENCOUNTER (OUTPATIENT)
Facility: MEDICAL CENTER | Age: 39
End: 2024-03-05
Attending: NURSE PRACTITIONER
Payer: COMMERCIAL

## 2024-03-05 ENCOUNTER — OFFICE VISIT (OUTPATIENT)
Dept: URGENT CARE | Facility: CLINIC | Age: 39
End: 2024-03-05
Payer: COMMERCIAL

## 2024-03-05 VITALS
DIASTOLIC BLOOD PRESSURE: 78 MMHG | TEMPERATURE: 97.8 F | HEART RATE: 88 BPM | OXYGEN SATURATION: 98 % | WEIGHT: 220 LBS | SYSTOLIC BLOOD PRESSURE: 110 MMHG | RESPIRATION RATE: 12 BRPM | HEIGHT: 64 IN | BODY MASS INDEX: 37.56 KG/M2

## 2024-03-05 DIAGNOSIS — J02.9 SORE THROAT: ICD-10-CM

## 2024-03-05 DIAGNOSIS — R68.89 FLU-LIKE SYMPTOMS: ICD-10-CM

## 2024-03-05 LAB
FLUAV RNA SPEC QL NAA+PROBE: NEGATIVE
FLUBV RNA SPEC QL NAA+PROBE: NEGATIVE
RSV RNA SPEC QL NAA+PROBE: NEGATIVE
S PYO DNA SPEC NAA+PROBE: NOT DETECTED
SARS-COV-2 RNA RESP QL NAA+PROBE: NEGATIVE

## 2024-03-05 PROCEDURE — 87070 CULTURE OTHR SPECIMN AEROBIC: CPT

## 2024-03-05 PROCEDURE — 87651 STREP A DNA AMP PROBE: CPT | Performed by: NURSE PRACTITIONER

## 2024-03-05 PROCEDURE — 3074F SYST BP LT 130 MM HG: CPT | Performed by: NURSE PRACTITIONER

## 2024-03-05 PROCEDURE — 87077 CULTURE AEROBIC IDENTIFY: CPT

## 2024-03-05 PROCEDURE — 3078F DIAST BP <80 MM HG: CPT | Performed by: NURSE PRACTITIONER

## 2024-03-05 PROCEDURE — 0241U POCT CEPHEID COV-2, FLU A/B, RSV - PCR: CPT | Performed by: NURSE PRACTITIONER

## 2024-03-05 PROCEDURE — 99213 OFFICE O/P EST LOW 20 MIN: CPT | Performed by: NURSE PRACTITIONER

## 2024-03-05 RX ORDER — DEXTROMETHORPHAN HYDROBROMIDE AND PROMETHAZINE HYDROCHLORIDE 15; 6.25 MG/5ML; MG/5ML
5 SYRUP ORAL EVERY 6 HOURS PRN
Qty: 100 ML | Refills: 0 | Status: SHIPPED | OUTPATIENT
Start: 2024-03-05

## 2024-03-05 ASSESSMENT — ENCOUNTER SYMPTOMS
NEUROLOGICAL NEGATIVE: 1
CARDIOVASCULAR NEGATIVE: 1
HEMOPTYSIS: 0
EYES NEGATIVE: 1
SORE THROAT: 1
PSYCHIATRIC NEGATIVE: 1
SPUTUM PRODUCTION: 1
COUGH: 1
WHEEZING: 0
CHILLS: 0
FEVER: 0
CONSTITUTIONAL NEGATIVE: 1
SHORTNESS OF BREATH: 0
GASTROINTESTINAL NEGATIVE: 1
MYALGIAS: 1

## 2024-03-05 ASSESSMENT — FIBROSIS 4 INDEX: FIB4 SCORE: 0.43

## 2024-03-05 ASSESSMENT — COPD QUESTIONNAIRES: COPD: 0

## 2024-03-05 NOTE — PROGRESS NOTES
Subjective:   Gabi Ruth is a 38 y.o. female who presents for Cough (Sx Friday , NEG COVID TEST today) and Pharyngitis (Hx of strep )      Cough  This is a new problem. Episode onset: 4 days. The problem has been gradually worsening. The problem occurs constantly. The cough is Non-productive. Associated symptoms include myalgias, nasal congestion and a sore throat. Pertinent negatives include no chills, fever, hemoptysis, shortness of breath or wheezing. Nothing aggravates the symptoms. She has tried OTC cough suppressant and cool air for the symptoms. The treatment provided mild relief. There is no history of asthma, bronchiectasis, bronchitis, COPD, emphysema, environmental allergies or pneumonia.   Pharyngitis   This is a new (History of mitral valve prolapse) problem. Episode onset: 4 days. The problem has been gradually worsening. Neither side of throat is experiencing more pain than the other. There has been no fever. The pain is at a severity of 5/10. The pain is moderate. Associated symptoms include congestion and coughing. Pertinent negatives include no shortness of breath. She has tried NSAIDs, gargles and acetaminophen for the symptoms. The treatment provided mild relief.       Review of Systems   Constitutional: Negative.  Negative for chills and fever.   HENT:  Positive for congestion and sore throat.    Eyes: Negative.    Respiratory:  Positive for cough and sputum production. Negative for hemoptysis, shortness of breath and wheezing.    Cardiovascular: Negative.    Gastrointestinal: Negative.    Genitourinary: Negative.    Musculoskeletal:  Positive for myalgias.   Skin: Negative.    Neurological: Negative.    Endo/Heme/Allergies: Negative.  Negative for environmental allergies.   Psychiatric/Behavioral: Negative.     All other systems reviewed and are negative.      Medications, Allergies, and current problem list reviewed today in Epic.     Objective:     /78 (BP Location: Left  "arm, Patient Position: Sitting, BP Cuff Size: Adult)   Pulse 88   Temp 36.6 °C (97.8 °F) (Temporal)   Resp 12   Ht 1.626 m (5' 4\")   Wt 99.8 kg (220 lb)   SpO2 98%     Physical Exam  Vitals reviewed.   Constitutional:       General: She is not in acute distress.     Appearance: Normal appearance. She is ill-appearing.   HENT:      Head: Normocephalic and atraumatic.      Right Ear: Tympanic membrane, ear canal and external ear normal.      Left Ear: Tympanic membrane, ear canal and external ear normal.      Nose: Congestion and rhinorrhea present.      Mouth/Throat:      Mouth: Mucous membranes are moist.      Pharynx: Oropharynx is clear. Posterior oropharyngeal erythema present.   Eyes:      Extraocular Movements: Extraocular movements intact.      Conjunctiva/sclera: Conjunctivae normal.      Pupils: Pupils are equal, round, and reactive to light.   Cardiovascular:      Rate and Rhythm: Normal rate and regular rhythm.   Pulmonary:      Effort: Pulmonary effort is normal.      Breath sounds: Normal breath sounds.   Abdominal:      General: Abdomen is flat.      Palpations: Abdomen is soft.   Musculoskeletal:         General: Normal range of motion.      Cervical back: Normal range of motion and neck supple.   Skin:     General: Skin is warm and dry.      Capillary Refill: Capillary refill takes less than 2 seconds.   Neurological:      General: No focal deficit present.      Mental Status: She is alert.   Psychiatric:         Mood and Affect: Mood normal.         Behavior: Behavior normal.         Results for orders placed or performed in visit on 03/05/24   POCT GROUP A STREP, PCR   Result Value Ref Range    POC Group A Strep, PCR Not Detected Not Detected, Invalid   POCT CoV-2, Flu A/B, RSV by PCR   Result Value Ref Range    SARS-CoV-2 by PCR Negative Negative, Invalid    Influenza virus A RNA Negative Negative, Invalid    Influenza virus B, PCR Negative Negative, Invalid    RSV, PCR Negative Negative, " Invalid       Assessment/Plan:     Diagnosis and associated orders:     1. Sore throat  POCT GROUP A STREP, PCR    CULTURE THROAT      2. Flu-like symptoms  POCT CoV-2, Flu A/B, RSV by PCR    promethazine-dextromethorphan (PROMETHAZINE-DM) 6.25-15 MG/5ML syrup         Comments/MDM:     Advised patient symptoms are viral in etiology, recommend supportive care. Increased fluids and rest.  Recommend over-the-counter cold and flu medications and Tylenol and/or ibuprofen for symptomatic relief and fevers.  Discussed use of nedi-pot, humidifier, and Flonase nasal spray as well.  Discussed good hand hygiene and ways to decrease spread of disease.  Follow-up with PCP return for reevaluation if symptoms persist/worsen.  Patient offered discharge instructions regarding viral illness.  The patient demonstrated a good understanding and agreed with the treatment plan.            Differential diagnosis, natural history, supportive care, and indications for immediate follow-up discussed.    Advised the patient to follow-up with the primary care physician for recheck, reevaluation, and consideration of further management.    Please note that this dictation was created using voice recognition software. I have made a reasonable attempt to correct obvious errors, but I expect that there are errors of grammar and possibly content that I did not discover before finalizing the note.    This note was electronically signed by GERALDINE Batista

## 2024-03-05 NOTE — LETTER
March 5, 2024       Patient: Gabi Ruth   YOB: 1985   Date of Visit: 3/5/2024         To Whom It May Concern:    In my medical opinion, I recommend that Gabi Ruth be excused from work from 3/4/2023 through 3/6/2024 due to illness.     If you have any questions or concerns, please don't hesitate to call [phone]          Sincerely,          [email or web address].  Electronically Signed

## 2024-03-08 LAB
BACTERIA SPEC RESP CULT: NORMAL
SIGNIFICANT IND 70042: NORMAL
SITE SITE: NORMAL
SOURCE SOURCE: NORMAL

## 2024-06-11 ENCOUNTER — HOSPITAL ENCOUNTER (OUTPATIENT)
Facility: MEDICAL CENTER | Age: 39
End: 2024-06-11
Attending: PHYSICIAN ASSISTANT
Payer: COMMERCIAL

## 2024-06-11 ENCOUNTER — APPOINTMENT (OUTPATIENT)
Dept: URGENT CARE | Facility: CLINIC | Age: 39
End: 2024-06-11
Payer: COMMERCIAL

## 2024-06-11 ENCOUNTER — OFFICE VISIT (OUTPATIENT)
Dept: URGENT CARE | Facility: CLINIC | Age: 39
End: 2024-06-11
Payer: COMMERCIAL

## 2024-06-11 VITALS
TEMPERATURE: 97.6 F | SYSTOLIC BLOOD PRESSURE: 144 MMHG | HEART RATE: 95 BPM | OXYGEN SATURATION: 95 % | BODY MASS INDEX: 36.7 KG/M2 | DIASTOLIC BLOOD PRESSURE: 76 MMHG | WEIGHT: 215 LBS | RESPIRATION RATE: 14 BRPM | HEIGHT: 64 IN

## 2024-06-11 DIAGNOSIS — J06.9 ACUTE URI: ICD-10-CM

## 2024-06-11 DIAGNOSIS — N30.00 ACUTE CYSTITIS WITHOUT HEMATURIA: ICD-10-CM

## 2024-06-11 LAB
APPEARANCE UR: CLEAR
BILIRUB UR STRIP-MCNC: NORMAL MG/DL
COLOR UR AUTO: YELLOW
GLUCOSE UR STRIP.AUTO-MCNC: NORMAL MG/DL
KETONES UR STRIP.AUTO-MCNC: NORMAL MG/DL
LEUKOCYTE ESTERASE UR QL STRIP.AUTO: NORMAL
NITRITE UR QL STRIP.AUTO: NORMAL
PH UR STRIP.AUTO: 6.5 [PH] (ref 5–8)
POCT INT CON NEG: NEGATIVE
POCT INT CON POS: POSITIVE
POCT URINE PREGNANCY TEST: NEGATIVE
PROT UR QL STRIP: NORMAL MG/DL
RBC UR QL AUTO: NORMAL
SP GR UR STRIP.AUTO: 1.01
UROBILINOGEN UR STRIP-MCNC: 0.2 MG/DL

## 2024-06-11 PROCEDURE — 81025 URINE PREGNANCY TEST: CPT | Performed by: PHYSICIAN ASSISTANT

## 2024-06-11 PROCEDURE — 99214 OFFICE O/P EST MOD 30 MIN: CPT | Performed by: PHYSICIAN ASSISTANT

## 2024-06-11 PROCEDURE — 3078F DIAST BP <80 MM HG: CPT | Performed by: PHYSICIAN ASSISTANT

## 2024-06-11 PROCEDURE — 3077F SYST BP >= 140 MM HG: CPT | Performed by: PHYSICIAN ASSISTANT

## 2024-06-11 PROCEDURE — 81002 URINALYSIS NONAUTO W/O SCOPE: CPT | Performed by: PHYSICIAN ASSISTANT

## 2024-06-11 PROCEDURE — 87086 URINE CULTURE/COLONY COUNT: CPT

## 2024-06-11 RX ORDER — BENZONATATE 200 MG/1
200 CAPSULE ORAL 3 TIMES DAILY PRN
Qty: 30 CAPSULE | Refills: 0 | Status: SHIPPED | OUTPATIENT
Start: 2024-06-11

## 2024-06-11 RX ORDER — CEFDINIR 300 MG/1
300 CAPSULE ORAL 2 TIMES DAILY
Qty: 10 CAPSULE | Refills: 0 | Status: SHIPPED | OUTPATIENT
Start: 2024-06-11 | End: 2024-06-16

## 2024-06-11 ASSESSMENT — ENCOUNTER SYMPTOMS
SPUTUM PRODUCTION: 0
SHORTNESS OF BREATH: 0
ABDOMINAL PAIN: 0
FEVER: 1
FLANK PAIN: 0
PALPITATIONS: 0
SINUS PAIN: 0
DIZZINESS: 0
WHEEZING: 0
SORE THROAT: 1
VOMITING: 0
HEADACHES: 1
DIARRHEA: 0
NAUSEA: 0
CHILLS: 0
COUGH: 1
MYALGIAS: 1
DIAPHORESIS: 0

## 2024-06-11 ASSESSMENT — FIBROSIS 4 INDEX: FIB4 SCORE: 0.43

## 2024-06-11 NOTE — LETTER
June 11, 2024    To Whom It May Concern:         This is confirmation that Gabigarett Ruth attended her scheduled appointment with Chris Nobles P.A.-C. on 6/11/24.  Please excuse the patient's absence from work on 6/11/2024 and 6/12/2024.  Cleared to return on 6/13/2024.         If you have any questions please do not hesitate to call me at the phone number listed below.    Sincerely,          Chris Nobles P.A.-C.  [phone]

## 2024-06-11 NOTE — PROGRESS NOTES
Subjective:     CHIEF COMPLAINT  Chief Complaint   Patient presents with    UTI     Uti , and was diagnosed with strep b in the urine culture and now she is feeling feverish and having swollen lymph nodes , dry cough and headaches        HPI  Gabi Ruth is a very pleasant 38 y.o. female who presents to the clinic with URI-like symptoms x 3-4 days.  States she initially noted pain and achiness over the low back which concerned her for potential UTI.  The following day she developed cough, congestion, sore throat, headache and subjective fever.  Currently taking OTC cough and cold medication as needed.  She would like to rule out potential UTI however given she was recently treated for group B strep infection that was found when she went to [facility].  Recently completed a course of Keflex prior to her new symptoms starting.    REVIEW OF SYSTEMS  Review of Systems   Constitutional:  Positive for fever and malaise/fatigue. Negative for chills and diaphoresis.   HENT:  Positive for congestion and sore throat. Negative for ear pain and sinus pain.    Respiratory:  Positive for cough. Negative for sputum production, shortness of breath and wheezing.    Cardiovascular:  Negative for chest pain and palpitations.   Gastrointestinal:  Negative for abdominal pain, diarrhea, nausea and vomiting.   Genitourinary:  Positive for frequency. Negative for dysuria, flank pain, hematuria and urgency.   Musculoskeletal:  Positive for myalgias.   Neurological:  Positive for headaches. Negative for dizziness.   Endo/Heme/Allergies:  Negative for environmental allergies.       PAST MEDICAL HISTORY  Patient Active Problem List    Diagnosis Date Noted    Other constipation 03/15/2023    H/O metrorrhagia 03/15/2023    PMS (premenstrual syndrome) 01/19/2017    MVP (mitral valve prolapse) 01/19/2017       SURGICAL HISTORY  patient denies any surgical history    ALLERGIES  Allergies   Allergen Reactions    Amoxicillin Hives  "      CURRENT MEDICATIONS  Home Medications       Reviewed by Chris Nobles P.A.-C. (Physician Assistant) on 24 at 0914  Med List Status: <None>     Medication Last Dose Status   METAMUCIL FIBER PO Not Taking Active   norethindrone (AYGESTIN) 5 MG tablet Taking Active   promethazine-dextromethorphan (PROMETHAZINE-DM) 6.25-15 MG/5ML syrup  Active                    SOCIAL HISTORY  Social History     Tobacco Use    Smoking status: Some Days     Types: Electronic Cigarettes    Smokeless tobacco: Never    Tobacco comments:     Smoked cigarettes starting at 19 and quit on and off, fully in 2020. Use e cigarette when stressed   Vaping Use    Vaping status: Never Used   Substance and Sexual Activity    Alcohol use: Not Currently    Drug use: Yes     Frequency: 1.0 times per week     Types: Marijuana     Comment: rarely    Sexual activity: Yes     Partners: Male     Birth control/protection: Rhythm       FAMILY HISTORY  Family History   Problem Relation Age of Onset    Cancer Mother             No Known Problems Father     Alcohol abuse Paternal Grandmother     Alcohol abuse Paternal Grandfather     Stroke Paternal Grandfather           Objective:     VITAL SIGNS: BP (!) 144/76 (BP Location: Left arm, Patient Position: Sitting, BP Cuff Size: Adult)   Pulse 95   Temp 36.4 °C (97.6 °F) (Temporal)   Resp 14   Ht 1.626 m (5' 4\")   Wt 97.5 kg (215 lb)   SpO2 95%   BMI 36.90 kg/m²     PHYSICAL EXAM  Physical Exam  Constitutional:       General: She is not in acute distress.     Appearance: Normal appearance. She is not ill-appearing, toxic-appearing or diaphoretic.   HENT:      Head: Normocephalic and atraumatic.      Right Ear: Tympanic membrane, ear canal and external ear normal.      Left Ear: Tympanic membrane, ear canal and external ear normal.      Nose: Congestion present. No rhinorrhea.      Mouth/Throat:      Mouth: Mucous membranes are moist.      Pharynx: No oropharyngeal exudate or posterior " oropharyngeal erythema.   Eyes:      Conjunctiva/sclera: Conjunctivae normal.   Cardiovascular:      Rate and Rhythm: Normal rate and regular rhythm.      Pulses: Normal pulses.      Heart sounds: Normal heart sounds.   Pulmonary:      Effort: Pulmonary effort is normal.      Breath sounds: Normal breath sounds. No wheezing, rhonchi or rales.   Musculoskeletal:      Cervical back: Normal range of motion. No muscular tenderness.   Lymphadenopathy:      Cervical: No cervical adenopathy.   Skin:     General: Skin is warm and dry.      Capillary Refill: Capillary refill takes less than 2 seconds.   Neurological:      Mental Status: She is alert.   Psychiatric:         Mood and Affect: Mood normal.         Thought Content: Thought content normal.         Assessment/Plan:     1. Acute URI  benzonatate (TESSALON) 200 MG capsule      2. Acute cystitis without hematuria  POCT Urinalysis    POCT Pregnancy    URINE CULTURE(NEW)    cefdinir (OMNICEF) 300 MG Cap          MDM/Comments:    Patient presents with URI-like symptoms x 3-4 days.  Findings are consistent with a viral URI.  Elected to defer testing.  Lung sounds are clear.  OTC supportive recommendations were discussed.  Patient has also been experiencing urinary frequency over the last few days.  States she was recently treated for a UTI by [facility].  In clinic urinalysis reveals small leukocyte esterase.  She would like to begin empiric treatment.  Will start on a course of cefdinir.  Urine culture will be sent out and I will follow-up and adjust treatment if necessary.  Increase fluids encouraged.    Differential diagnosis, natural history, supportive care, and indications for immediate follow-up discussed. All questions answered. Patient agrees with the plan of care.    Follow-up as needed if symptoms worsen or fail to improve to PCP, Urgent care or Emergency Room.    I have personally reviewed prior external notes and test results pertinent to today's  visit.  I have independently reviewed and interpreted all diagnostics ordered during this urgent care acute visit.   Discussed management options (risks,benefits, and alternatives to treatment). Pt expresses understanding and the treatment plan was agreed upon. Questions were encouraged and answered to pt's satisfaction.    Please note that this dictation was created using voice recognition software. I have made a reasonable attempt to correct obvious errors, but I expect that there are errors of grammar and possibly content that I did not discover before finalizing the note.

## 2024-06-11 NOTE — LETTER
June 11, 2024    To Whom It May Concern:         This is confirmation that Gabi Moisean Faraz attended her scheduled appointment with Chris Nobles P.A.-C. on 6/11/24.  Please excuse patient's absence from work on 6/11/2024 and 6/12/2024.         If you have any questions please do not hesitate to call me at the phone number listed below.    Sincerely,          Chris Nobles P.A.-C.  [phone]

## 2024-06-13 LAB
BACTERIA UR CULT: NORMAL
SIGNIFICANT IND 70042: NORMAL
SITE SITE: NORMAL
SOURCE SOURCE: NORMAL

## 2025-02-11 ENCOUNTER — OFFICE VISIT (OUTPATIENT)
Dept: URGENT CARE | Facility: CLINIC | Age: 40
End: 2025-02-11
Payer: COMMERCIAL

## 2025-02-11 ENCOUNTER — APPOINTMENT (OUTPATIENT)
Dept: URGENT CARE | Facility: CLINIC | Age: 40
End: 2025-02-11
Payer: COMMERCIAL

## 2025-02-11 ENCOUNTER — HOSPITAL ENCOUNTER (OUTPATIENT)
Facility: MEDICAL CENTER | Age: 40
End: 2025-02-11
Attending: PHYSICIAN ASSISTANT
Payer: COMMERCIAL

## 2025-02-11 VITALS
DIASTOLIC BLOOD PRESSURE: 68 MMHG | WEIGHT: 216 LBS | SYSTOLIC BLOOD PRESSURE: 118 MMHG | BODY MASS INDEX: 37.08 KG/M2 | TEMPERATURE: 98.6 F | OXYGEN SATURATION: 98 % | RESPIRATION RATE: 16 BRPM | HEART RATE: 96 BPM

## 2025-02-11 DIAGNOSIS — R35.0 URINARY FREQUENCY: ICD-10-CM

## 2025-02-11 DIAGNOSIS — R10.9 FLANK PAIN: ICD-10-CM

## 2025-02-11 LAB
APPEARANCE UR: NORMAL
BILIRUB UR STRIP-MCNC: NEGATIVE MG/DL
COLOR UR AUTO: YELLOW
GLUCOSE UR STRIP.AUTO-MCNC: NEGATIVE MG/DL
KETONES UR STRIP.AUTO-MCNC: NEGATIVE MG/DL
LEUKOCYTE ESTERASE UR QL STRIP.AUTO: NEGATIVE
NITRITE UR QL STRIP.AUTO: NEGATIVE
PH UR STRIP.AUTO: 7 [PH] (ref 5–8)
POCT INT CON NEG: NEGATIVE
POCT INT CON POS: POSITIVE
POCT URINE PREGNANCY TEST: NEGATIVE
PROT UR QL STRIP: NEGATIVE MG/DL
RBC UR QL AUTO: NEGATIVE
SP GR UR STRIP.AUTO: 1.02
UROBILINOGEN UR STRIP-MCNC: 0.2 MG/DL

## 2025-02-11 PROCEDURE — 87086 URINE CULTURE/COLONY COUNT: CPT

## 2025-02-11 PROCEDURE — 3078F DIAST BP <80 MM HG: CPT | Performed by: PHYSICIAN ASSISTANT

## 2025-02-11 PROCEDURE — 81002 URINALYSIS NONAUTO W/O SCOPE: CPT | Performed by: PHYSICIAN ASSISTANT

## 2025-02-11 PROCEDURE — 87510 GARDNER VAG DNA DIR PROBE: CPT

## 2025-02-11 PROCEDURE — 3074F SYST BP LT 130 MM HG: CPT | Performed by: PHYSICIAN ASSISTANT

## 2025-02-11 PROCEDURE — 81025 URINE PREGNANCY TEST: CPT | Performed by: PHYSICIAN ASSISTANT

## 2025-02-11 PROCEDURE — 87660 TRICHOMONAS VAGIN DIR PROBE: CPT

## 2025-02-11 PROCEDURE — 87480 CANDIDA DNA DIR PROBE: CPT

## 2025-02-11 PROCEDURE — 99213 OFFICE O/P EST LOW 20 MIN: CPT | Performed by: PHYSICIAN ASSISTANT

## 2025-02-11 RX ORDER — DROSPIRENONE 4 MG/1
TABLET, FILM COATED ORAL
COMMUNITY

## 2025-02-11 ASSESSMENT — FIBROSIS 4 INDEX: FIB4 SCORE: 0.44

## 2025-02-11 NOTE — PROGRESS NOTES
"Subjective:     CHIEF COMPLAINT  Chief Complaint   Patient presents with    Back Pain     \"I have a kidney infection. Pain on the left side of my back. Need to urinate often, but no other symptoms that I can tell.\"       HPI  Gabi Ruth is a very pleasant 39 y.o. female who presents to the clinic with urinary frequency which has been intermittent over the last week.  Denies any associated dysuria.  Last night she described experiencing random sharp pains to the left flank that came on at random.  Denies any aggravating or alleviating factors.  States she has had kidney infections in the past and would like to rule this out.  No prior history of nephrolithiasis.  She has not been running a fever.  No body aches, chills, nausea or vomiting.  No rash.  Denies any injury or change in activity.  No OTC medications have been started at this time.  Would also like testing for BV as she has had this in the past.  No concern for STI.    REVIEW OF SYSTEMS  Review of Systems   Constitutional:  Negative for chills, fever and malaise/fatigue.   Respiratory:  Negative for cough.    Gastrointestinal:  Negative for abdominal pain, blood in stool, constipation, diarrhea, melena, nausea and vomiting.   Genitourinary:  Positive for flank pain and frequency. Negative for dysuria, hematuria and urgency.   Musculoskeletal:  Positive for back pain. Negative for falls.   Skin:  Negative for rash.   Neurological:  Negative for dizziness and headaches.       PAST MEDICAL HISTORY  Patient Active Problem List    Diagnosis Date Noted    Other constipation 03/15/2023    H/O metrorrhagia 03/15/2023    PMS (premenstrual syndrome) 01/19/2017    MVP (mitral valve prolapse) 01/19/2017       SURGICAL HISTORY  patient denies any surgical history    ALLERGIES  Allergies   Allergen Reactions    Amoxicillin Hives       CURRENT MEDICATIONS  Home Medications       Reviewed by Chris Nobles P.A.-C. (Physician Assistant) on 02/11/25 at 1406  Med " List Status: <None>     Medication Last Dose Status   benzonatate (TESSALON) 200 MG capsule  Active   Drospirenone (SLYND) 4 MG Tab Taking Active   METAMUCIL FIBER PO Not Taking Active   norethindrone (AYGESTIN) 5 MG tablet  Active   promethazine-dextromethorphan (PROMETHAZINE-DM) 6.25-15 MG/5ML syrup  Active                    SOCIAL HISTORY  Social History     Tobacco Use    Smoking status: Former     Types: Electronic Cigarettes    Smokeless tobacco: Never    Tobacco comments:     Smoked cigarettes starting at 19 and quit on and off, fully in 2020. Use e cigarette when stressed   Vaping Use    Vaping status: Every Day   Substance and Sexual Activity    Alcohol use: Not Currently    Drug use: Yes     Frequency: 1.0 times per week     Types: Marijuana     Comment: rarely    Sexual activity: Yes     Partners: Male     Birth control/protection: Rhythm       FAMILY HISTORY  Family History   Problem Relation Age of Onset    Cancer Mother             No Known Problems Father     Alcohol abuse Paternal Grandmother     Alcohol abuse Paternal Grandfather     Stroke Paternal Grandfather           Objective:     VITAL SIGNS: /68   Pulse 96   Temp 37 °C (98.6 °F) (Temporal)   Resp 16   Wt 98 kg (216 lb)   SpO2 98%   BMI 37.08 kg/m²     PHYSICAL EXAM  Physical Exam  Constitutional:       General: She is not in acute distress.     Appearance: Normal appearance. She is not ill-appearing, toxic-appearing or diaphoretic.   HENT:      Head: Normocephalic and atraumatic.   Eyes:      Conjunctiva/sclera: Conjunctivae normal.   Cardiovascular:      Rate and Rhythm: Normal rate and regular rhythm.      Pulses: Normal pulses.      Heart sounds: Normal heart sounds.   Pulmonary:      Effort: Pulmonary effort is normal.   Abdominal:      General: Bowel sounds are normal. There is no distension.      Palpations: Abdomen is soft.      Tenderness: There is no abdominal tenderness. There is no right CVA tenderness, left  CVA tenderness, guarding or rebound.   Musculoskeletal:      Cervical back: Normal range of motion.   Neurological:      General: No focal deficit present.      Mental Status: She is alert and oriented to person, place, and time. Mental status is at baseline.       Lab Results/POC Test Results   Results for orders placed or performed in visit on 02/11/25   POCT Urinalysis    Collection Time: 02/11/25  2:53 PM   Result Value Ref Range    POC Color Yellow Negative    POC Appearance Cloudy Negative    POC Glucose Negative Negative mg/dL    POC Bilirubin Negative Negative mg/dL    POC Ketones Negative Negative mg/dL    POC Specific Gravity 1.020 <1.005 - >1.030    POC Blood Negative Negative    POC Urine PH 7.0 5.0 - 8.0    POC Protein Negative Negative mg/dL    POC Urobiligen 0.2 Negative (0.2) mg/dL    POC Nitrites Negative Negative    POC Leukocyte Esterase Negative Negative   POCT Pregnancy    Collection Time: 02/11/25  2:53 PM   Result Value Ref Range    POC Urine Pregnancy Test Negative     Internal Control Positive Positive     Internal Control Negative Negative              Assessment/Plan:     1. Flank pain  POCT Urinalysis    POCT Pregnancy    URINE CULTURE(NEW)      2. Urinary frequency  VAGINAL PATHOGENS DNA PANEL          MDM/Comments:    Pleasant and well-appearing 39-year-old female presented to the clinic with urinary frequency that has been intermittent over the last week.  Urinalysis performed in clinic without any abnormalities.  No signs of infection.  No hematuria.  Negative urine hCG.  Last night she began experiencing intermittent left-sided flank pain.  Describes intermittent sharp pains that come and go at random.  No prior history of nephrolithiasis.  Denies any preceding injury or change in activity.  On exam she has mild tenderness over the left thoracolumbar paraspinal musculature.  No CVA tenderness.  Given the patient's UA I discussed I am lowly suspicious for kidney function as she  originally believed.  We will send her urine out for culture for confirmation.  Return precautions were discussed.  States she would like to be tested for BV as she has had this in the past and has experienced urinary frequency before when she had a flareup.  Will send out for a vaginal pathogen screen and I will follow-up once available.    Differential diagnosis, natural history, supportive care, and indications for immediate follow-up discussed. All questions answered. Patient agrees with the plan of care.    Follow-up as needed if symptoms worsen or fail to improve to PCP, Urgent care or Emergency Room.    I have personally reviewed prior external notes and test results pertinent to today's visit.  I have independently reviewed and interpreted all diagnostics ordered during this urgent care acute visit.   Discussed management options (risks,benefits, and alternatives to treatment). Pt expresses understanding and the treatment plan was agreed upon. Questions were encouraged and answered to pt's satisfaction.    Please note that this dictation was created using voice recognition software. I have made a reasonable attempt to correct obvious errors, but I expect that there are errors of grammar and possibly content that I did not discover before finalizing the note.

## 2025-02-12 LAB
CANDIDA DNA VAG QL PROBE+SIG AMP: NEGATIVE
G VAGINALIS DNA VAG QL PROBE+SIG AMP: NEGATIVE
T VAGINALIS DNA VAG QL PROBE+SIG AMP: NEGATIVE

## 2025-02-12 ASSESSMENT — ENCOUNTER SYMPTOMS
HEADACHES: 0
VOMITING: 0
FALLS: 0
CONSTIPATION: 0
FEVER: 0
NAUSEA: 0
DIARRHEA: 0
BLOOD IN STOOL: 0
COUGH: 0
FLANK PAIN: 1
BACK PAIN: 1
CHILLS: 0
DIZZINESS: 0
ABDOMINAL PAIN: 0

## 2025-02-14 LAB
BACTERIA UR CULT: NORMAL
SIGNIFICANT IND 70042: NORMAL
SITE SITE: NORMAL
SOURCE SOURCE: NORMAL